# Patient Record
Sex: MALE | Race: OTHER | HISPANIC OR LATINO | URBAN - METROPOLITAN AREA
[De-identification: names, ages, dates, MRNs, and addresses within clinical notes are randomized per-mention and may not be internally consistent; named-entity substitution may affect disease eponyms.]

---

## 2022-06-21 ENCOUNTER — INPATIENT (INPATIENT)
Facility: HOSPITAL | Age: 57
LOS: 2 days | Discharge: HOME | End: 2022-06-24
Attending: HOSPITALIST | Admitting: HOSPITALIST
Payer: COMMERCIAL

## 2022-06-21 VITALS
SYSTOLIC BLOOD PRESSURE: 138 MMHG | OXYGEN SATURATION: 100 % | DIASTOLIC BLOOD PRESSURE: 71 MMHG | TEMPERATURE: 103 F | HEART RATE: 112 BPM | RESPIRATION RATE: 18 BRPM

## 2022-06-21 PROCEDURE — 93010 ELECTROCARDIOGRAM REPORT: CPT | Mod: NC

## 2022-06-21 PROCEDURE — 99285 EMERGENCY DEPT VISIT HI MDM: CPT | Mod: 25

## 2022-06-21 RX ORDER — SODIUM CHLORIDE 9 MG/ML
2000 INJECTION INTRAMUSCULAR; INTRAVENOUS; SUBCUTANEOUS ONCE
Refills: 0 | Status: COMPLETED | OUTPATIENT
Start: 2022-06-21 | End: 2022-06-21

## 2022-06-21 RX ORDER — KETOROLAC TROMETHAMINE 30 MG/ML
15 SYRINGE (ML) INJECTION ONCE
Refills: 0 | Status: DISCONTINUED | OUTPATIENT
Start: 2022-06-21 | End: 2022-06-21

## 2022-06-21 RX ORDER — ONDANSETRON 8 MG/1
4 TABLET, FILM COATED ORAL ONCE
Refills: 0 | Status: COMPLETED | OUTPATIENT
Start: 2022-06-21 | End: 2022-06-21

## 2022-06-21 RX ADMIN — SODIUM CHLORIDE 2000 MILLILITER(S): 9 INJECTION INTRAMUSCULAR; INTRAVENOUS; SUBCUTANEOUS at 23:35

## 2022-06-21 RX ADMIN — ONDANSETRON 4 MILLIGRAM(S): 8 TABLET, FILM COATED ORAL at 23:36

## 2022-06-21 RX ADMIN — Medication 15 MILLIGRAM(S): at 23:36

## 2022-06-21 NOTE — ED PROVIDER NOTE - CLINICAL SUMMARY MEDICAL DECISION MAKING FREE TEXT BOX
56M w no reported medical history presents w/ viral syndrome x 5 days, now w/ chest pain w/ cough, no clinical signs of dvt, ekg w/ rbbb, ambulatory sat reassuring, no urinary symptoms, no meningeal signs, plan for labs, rvp, cxr, trop/bnp, medications, fluids, reassessment see attending attestation

## 2022-06-21 NOTE — ED ADULT TRIAGE NOTE - CHIEF COMPLAINT QUOTE
Patient c/o fevers and SOB for 4 days. Endorses CP, body aches. Received two doses of COVID vaccine. Had negative rapid COVID and flu test today at urgent care. Last took Tylenol at 8pm.

## 2022-06-21 NOTE — ED PROVIDER NOTE - CADM POA PRESS ULCER
[Good understanding] : Patient has a good understanding of lifestyle changes and the steps needed to achieve self management goals
No

## 2022-06-21 NOTE — ED PROVIDER NOTE - OBJECTIVE STATEMENT
Swiss translation by daughter at bedside, declines translation services    56M w no reported medical history presents w 5 days of fevers, chills, nausea, body aches, cough, chest pain w/ cough, and exertional shortness of breath.

## 2022-06-21 NOTE — ED ADULT NURSE NOTE - OBJECTIVE STATEMENT
Malaysian speaking daughter translating at bedside. pt a&ox4 c/o subjective fevers, chills, nausea, body aches, cough, chest pain associated with cough, and headache associated with cough. pt c/o SOB upon exertion. pt respirations even and unlabored with no accessory muscle use. pt sat 98% on RA. 20g to the RAC. labs collected and sent. urine collected and sent. pt medicated as per md orders. pt ambulatory at baseline. pt in NAD and resting in stretcher. pt denies pmhx.

## 2022-06-21 NOTE — ED PROVIDER NOTE - NS ED ROS FT
Constitutional:  (+) fever, (+) chills, (-) lethargy  ENMT: (-) nasal discharge, (+) sore throat, (-) neck pain or stiffness  Cardiac: (+) chest pain (-) palpitations  Respiratory:  (+) cough (+) SOB   GI:  (+) nausea (-) vomiting (-) diarrhea (-) abdominal pain  :  (-) dysuria (-) frequency (-) burning  MS:  (-) back pain (-) joint pain.  Neuro:  (-) headache (-) numbness (-) tingling (-) focal weakness  Skin:  (-) rash

## 2022-06-21 NOTE — ED PROVIDER NOTE - ATTENDING APP SHARED VISIT CONTRIBUTION OF CARE
56M w no reported medical history presents w/ viral syndrome x 5 days, now w/ chest pain w/ cough, no clinical signs of dvt, ekg w/ rbbb, ambulatory sat reassuring, no urinary symptoms, no meningeal signs, plan for labs, rvp, cxr, trop/bnp, medications, fluids, reassessment

## 2022-06-21 NOTE — ED PROVIDER NOTE - PHYSICAL EXAMINATION
CONSTITUTIONAL: NAD, awake, alert  HEAD: Normocephalic; atraumatic  ENMT: External appears normal, MMM  NECK: no tenderness, FROM, no nuchal rigidity   CARD: Normal Sl, S2; no audible murmurs  RESP: normal wob, lungs ctab, ambulatory sat 95%  ABD: soft, non-distended; non-tender  MSK: no edema, normal ROM in all four extremities  SKIN: Warm, dry, no rashes  NEURO: aaox3, moving all extremities spontaneously

## 2022-06-21 NOTE — ED PROVIDER NOTE - NS ED ATTENDING STATEMENT MOD
Attending Only This was a shared visit with the EMMA. I reviewed and verified the documentation and independently performed the documented:

## 2022-06-21 NOTE — ED PROVIDER NOTE - PROGRESS NOTE DETAILS
ANTHONY Dc: Pt signed out to me pending labs, CXR and rpt O2. Pt now tachy and febrile, getting IV fluids, ordered for ofirmev. ANTHONY Dc: Spoke with hospitalist for admission as pt is tachycardic, reports does not feel enough improved to go home at this time, hospitalist recommending further ED evaluation for source of fever. Discussed with , pt ordered for CT chest. ANTHONY Nowak: Received sign out from ANTHONY Dc to f/u ct chest. Spoke w/ radiology, state CT chest consistent w/ RUL Tree-in-bud opacities. Pt consistently febrile, on 2L O2 NC due to hypoxia (88% when asleep, lows 90s when awake). Plan to continue to with admission. Pt ok w/ plan. ANTHONY Nowak: Received sign out from ANTHONY Dc to f/u ct chest. Spoke w/ radiology, state CT chest consistent w/ RUL Tree-in-bud opacities. Pt consistently febrile, on 2L O2 NC due to hypoxia (88% when asleep, lows 90s when awake). Plan to continue to with admission. Pt ok w/ plan. Used Language [employer]  ID [id number]

## 2022-06-22 DIAGNOSIS — A41.9 SEPSIS, UNSPECIFIED ORGANISM: ICD-10-CM

## 2022-06-22 DIAGNOSIS — J18.9 PNEUMONIA, UNSPECIFIED ORGANISM: ICD-10-CM

## 2022-06-22 DIAGNOSIS — Z87.81 PERSONAL HISTORY OF (HEALED) TRAUMATIC FRACTURE: Chronic | ICD-10-CM

## 2022-06-22 DIAGNOSIS — Z29.9 ENCOUNTER FOR PROPHYLACTIC MEASURES, UNSPECIFIED: ICD-10-CM

## 2022-06-22 LAB
ALBUMIN SERPL ELPH-MCNC: 4.7 G/DL — SIGNIFICANT CHANGE UP (ref 3.3–5)
ALP SERPL-CCNC: 102 U/L — SIGNIFICANT CHANGE UP (ref 40–120)
ALT FLD-CCNC: 21 U/L — SIGNIFICANT CHANGE UP (ref 4–41)
ANION GAP SERPL CALC-SCNC: 14 MMOL/L — SIGNIFICANT CHANGE UP (ref 7–14)
ANION GAP SERPL CALC-SCNC: 15 MMOL/L — HIGH (ref 7–14)
APPEARANCE UR: CLEAR — SIGNIFICANT CHANGE UP
AST SERPL-CCNC: 16 U/L — SIGNIFICANT CHANGE UP (ref 4–40)
B PERT DNA SPEC QL NAA+PROBE: SIGNIFICANT CHANGE UP
B PERT DNA SPEC QL NAA+PROBE: SIGNIFICANT CHANGE UP
B PERT+PARAPERT DNA PNL SPEC NAA+PROBE: SIGNIFICANT CHANGE UP
B PERT+PARAPERT DNA PNL SPEC NAA+PROBE: SIGNIFICANT CHANGE UP
BASE EXCESS BLDV CALC-SCNC: 1.2 MMOL/L — SIGNIFICANT CHANGE UP (ref -2–3)
BASOPHILS # BLD AUTO: 0.05 K/UL — SIGNIFICANT CHANGE UP (ref 0–0.2)
BASOPHILS NFR BLD AUTO: 0.3 % — SIGNIFICANT CHANGE UP (ref 0–2)
BILIRUB SERPL-MCNC: 0.5 MG/DL — SIGNIFICANT CHANGE UP (ref 0.2–1.2)
BILIRUB UR-MCNC: NEGATIVE — SIGNIFICANT CHANGE UP
BLOOD GAS VENOUS COMPREHENSIVE RESULT: SIGNIFICANT CHANGE UP
BORDETELLA PARAPERTUSSIS (RAPRVP): SIGNIFICANT CHANGE UP
BORDETELLA PARAPERTUSSIS (RAPRVP): SIGNIFICANT CHANGE UP
BUN SERPL-MCNC: 11 MG/DL — SIGNIFICANT CHANGE UP (ref 7–23)
BUN SERPL-MCNC: 16 MG/DL — SIGNIFICANT CHANGE UP (ref 7–23)
C PNEUM DNA SPEC QL NAA+PROBE: SIGNIFICANT CHANGE UP
C PNEUM DNA SPEC QL NAA+PROBE: SIGNIFICANT CHANGE UP
CALCIUM SERPL-MCNC: 8.7 MG/DL — SIGNIFICANT CHANGE UP (ref 8.4–10.5)
CALCIUM SERPL-MCNC: 9.2 MG/DL — SIGNIFICANT CHANGE UP (ref 8.4–10.5)
CHLORIDE BLDV-SCNC: 99 MMOL/L — SIGNIFICANT CHANGE UP (ref 96–108)
CHLORIDE SERPL-SCNC: 101 MMOL/L — SIGNIFICANT CHANGE UP (ref 98–107)
CHLORIDE SERPL-SCNC: 99 MMOL/L — SIGNIFICANT CHANGE UP (ref 98–107)
CO2 BLDV-SCNC: 30.2 MMOL/L — HIGH (ref 22–26)
CO2 SERPL-SCNC: 19 MMOL/L — LOW (ref 22–31)
CO2 SERPL-SCNC: 25 MMOL/L — SIGNIFICANT CHANGE UP (ref 22–31)
COLOR SPEC: YELLOW — SIGNIFICANT CHANGE UP
CREAT SERPL-MCNC: 0.64 MG/DL — SIGNIFICANT CHANGE UP (ref 0.5–1.3)
CREAT SERPL-MCNC: 0.78 MG/DL — SIGNIFICANT CHANGE UP (ref 0.5–1.3)
CULTURE RESULTS: SIGNIFICANT CHANGE UP
DIFF PNL FLD: NEGATIVE — SIGNIFICANT CHANGE UP
EGFR: 106 ML/MIN/1.73M2 — SIGNIFICANT CHANGE UP
EGFR: 112 ML/MIN/1.73M2 — SIGNIFICANT CHANGE UP
EOSINOPHIL # BLD AUTO: 0 K/UL — SIGNIFICANT CHANGE UP (ref 0–0.5)
EOSINOPHIL NFR BLD AUTO: 0 % — SIGNIFICANT CHANGE UP (ref 0–6)
FLUAV SUBTYP SPEC NAA+PROBE: SIGNIFICANT CHANGE UP
FLUAV SUBTYP SPEC NAA+PROBE: SIGNIFICANT CHANGE UP
FLUBV RNA SPEC QL NAA+PROBE: SIGNIFICANT CHANGE UP
FLUBV RNA SPEC QL NAA+PROBE: SIGNIFICANT CHANGE UP
GAS PNL BLDV: 134 MMOL/L — LOW (ref 136–145)
GLUCOSE BLDV-MCNC: 119 MG/DL — HIGH (ref 70–99)
GLUCOSE SERPL-MCNC: 107 MG/DL — HIGH (ref 70–99)
GLUCOSE SERPL-MCNC: 120 MG/DL — HIGH (ref 70–99)
GLUCOSE UR QL: NEGATIVE — SIGNIFICANT CHANGE UP
GRAM STN FLD: SIGNIFICANT CHANGE UP
HADV DNA SPEC QL NAA+PROBE: SIGNIFICANT CHANGE UP
HADV DNA SPEC QL NAA+PROBE: SIGNIFICANT CHANGE UP
HCO3 BLDV-SCNC: 28 MMOL/L — SIGNIFICANT CHANGE UP (ref 22–29)
HCOV 229E RNA SPEC QL NAA+PROBE: SIGNIFICANT CHANGE UP
HCOV 229E RNA SPEC QL NAA+PROBE: SIGNIFICANT CHANGE UP
HCOV HKU1 RNA SPEC QL NAA+PROBE: SIGNIFICANT CHANGE UP
HCOV HKU1 RNA SPEC QL NAA+PROBE: SIGNIFICANT CHANGE UP
HCOV NL63 RNA SPEC QL NAA+PROBE: SIGNIFICANT CHANGE UP
HCOV NL63 RNA SPEC QL NAA+PROBE: SIGNIFICANT CHANGE UP
HCOV OC43 RNA SPEC QL NAA+PROBE: SIGNIFICANT CHANGE UP
HCOV OC43 RNA SPEC QL NAA+PROBE: SIGNIFICANT CHANGE UP
HCT VFR BLD CALC: 44.3 % — SIGNIFICANT CHANGE UP (ref 39–50)
HCT VFR BLD CALC: 46.7 % — SIGNIFICANT CHANGE UP (ref 39–50)
HCT VFR BLDA CALC: 47 % — SIGNIFICANT CHANGE UP (ref 39–51)
HGB BLD CALC-MCNC: 15.7 G/DL — SIGNIFICANT CHANGE UP (ref 13–17)
HGB BLD-MCNC: 14.2 G/DL — SIGNIFICANT CHANGE UP (ref 13–17)
HGB BLD-MCNC: 15 G/DL — SIGNIFICANT CHANGE UP (ref 13–17)
HMPV RNA SPEC QL NAA+PROBE: SIGNIFICANT CHANGE UP
HMPV RNA SPEC QL NAA+PROBE: SIGNIFICANT CHANGE UP
HPIV1 RNA SPEC QL NAA+PROBE: SIGNIFICANT CHANGE UP
HPIV1 RNA SPEC QL NAA+PROBE: SIGNIFICANT CHANGE UP
HPIV2 RNA SPEC QL NAA+PROBE: SIGNIFICANT CHANGE UP
HPIV2 RNA SPEC QL NAA+PROBE: SIGNIFICANT CHANGE UP
HPIV3 RNA SPEC QL NAA+PROBE: SIGNIFICANT CHANGE UP
HPIV3 RNA SPEC QL NAA+PROBE: SIGNIFICANT CHANGE UP
HPIV4 RNA SPEC QL NAA+PROBE: SIGNIFICANT CHANGE UP
HPIV4 RNA SPEC QL NAA+PROBE: SIGNIFICANT CHANGE UP
IANC: 12.97 K/UL — HIGH (ref 1.8–7.4)
IMM GRANULOCYTES NFR BLD AUTO: 0.8 % — SIGNIFICANT CHANGE UP (ref 0–1.5)
KETONES UR-MCNC: NEGATIVE — SIGNIFICANT CHANGE UP
LACTATE BLDV-MCNC: 2.5 MMOL/L — HIGH (ref 0.5–2)
LACTATE SERPL-SCNC: 1.5 MMOL/L — SIGNIFICANT CHANGE UP (ref 0.5–2)
LEUKOCYTE ESTERASE UR-ACNC: NEGATIVE — SIGNIFICANT CHANGE UP
LYMPHOCYTES # BLD AUTO: 1.37 K/UL — SIGNIFICANT CHANGE UP (ref 1–3.3)
LYMPHOCYTES # BLD AUTO: 8.8 % — LOW (ref 13–44)
M PNEUMO DNA SPEC QL NAA+PROBE: SIGNIFICANT CHANGE UP
M PNEUMO DNA SPEC QL NAA+PROBE: SIGNIFICANT CHANGE UP
MAGNESIUM SERPL-MCNC: 2.1 MG/DL — SIGNIFICANT CHANGE UP (ref 1.6–2.6)
MCHC RBC-ENTMCNC: 28.4 PG — SIGNIFICANT CHANGE UP (ref 27–34)
MCHC RBC-ENTMCNC: 28.5 PG — SIGNIFICANT CHANGE UP (ref 27–34)
MCHC RBC-ENTMCNC: 32.1 GM/DL — SIGNIFICANT CHANGE UP (ref 32–36)
MCHC RBC-ENTMCNC: 32.1 GM/DL — SIGNIFICANT CHANGE UP (ref 32–36)
MCV RBC AUTO: 88.3 FL — SIGNIFICANT CHANGE UP (ref 80–100)
MCV RBC AUTO: 89 FL — SIGNIFICANT CHANGE UP (ref 80–100)
MONOCYTES # BLD AUTO: 1.03 K/UL — HIGH (ref 0–0.9)
MONOCYTES NFR BLD AUTO: 6.6 % — SIGNIFICANT CHANGE UP (ref 2–14)
NEUTROPHILS # BLD AUTO: 12.97 K/UL — HIGH (ref 1.8–7.4)
NEUTROPHILS NFR BLD AUTO: 83.5 % — HIGH (ref 43–77)
NITRITE UR-MCNC: NEGATIVE — SIGNIFICANT CHANGE UP
NRBC # BLD: 0 /100 WBCS — SIGNIFICANT CHANGE UP
NRBC # BLD: 0 /100 WBCS — SIGNIFICANT CHANGE UP
NRBC # FLD: 0 K/UL — SIGNIFICANT CHANGE UP
NRBC # FLD: 0 K/UL — SIGNIFICANT CHANGE UP
NT-PROBNP SERPL-SCNC: 96 PG/ML — SIGNIFICANT CHANGE UP
PCO2 BLDV: 54 MMHG — SIGNIFICANT CHANGE UP (ref 42–55)
PH BLDV: 7.33 — SIGNIFICANT CHANGE UP (ref 7.32–7.43)
PH UR: 6 — SIGNIFICANT CHANGE UP (ref 5–8)
PHOSPHATE SERPL-MCNC: 1.7 MG/DL — LOW (ref 2.5–4.5)
PLATELET # BLD AUTO: 182 K/UL — SIGNIFICANT CHANGE UP (ref 150–400)
PLATELET # BLD AUTO: 227 K/UL — SIGNIFICANT CHANGE UP (ref 150–400)
PO2 BLDV: 27 MMHG — SIGNIFICANT CHANGE UP
POTASSIUM BLDV-SCNC: 3.8 MMOL/L — SIGNIFICANT CHANGE UP (ref 3.5–5.1)
POTASSIUM SERPL-MCNC: 3.8 MMOL/L — SIGNIFICANT CHANGE UP (ref 3.5–5.3)
POTASSIUM SERPL-MCNC: 4.1 MMOL/L — SIGNIFICANT CHANGE UP (ref 3.5–5.3)
POTASSIUM SERPL-SCNC: 3.8 MMOL/L — SIGNIFICANT CHANGE UP (ref 3.5–5.3)
POTASSIUM SERPL-SCNC: 4.1 MMOL/L — SIGNIFICANT CHANGE UP (ref 3.5–5.3)
PROCALCITONIN SERPL-MCNC: 1.66 NG/ML — HIGH (ref 0.02–0.1)
PROT SERPL-MCNC: 8.1 G/DL — SIGNIFICANT CHANGE UP (ref 6–8.3)
PROT UR-MCNC: NEGATIVE — SIGNIFICANT CHANGE UP
RAPID RVP RESULT: SIGNIFICANT CHANGE UP
RAPID RVP RESULT: SIGNIFICANT CHANGE UP
RBC # BLD: 4.98 M/UL — SIGNIFICANT CHANGE UP (ref 4.2–5.8)
RBC # BLD: 5.29 M/UL — SIGNIFICANT CHANGE UP (ref 4.2–5.8)
RBC # FLD: 14.6 % — HIGH (ref 10.3–14.5)
RBC # FLD: 14.6 % — HIGH (ref 10.3–14.5)
RSV RNA SPEC QL NAA+PROBE: SIGNIFICANT CHANGE UP
RSV RNA SPEC QL NAA+PROBE: SIGNIFICANT CHANGE UP
RV+EV RNA SPEC QL NAA+PROBE: SIGNIFICANT CHANGE UP
RV+EV RNA SPEC QL NAA+PROBE: SIGNIFICANT CHANGE UP
SAO2 % BLDV: 38.6 % — SIGNIFICANT CHANGE UP
SARS-COV-2 RNA SPEC QL NAA+PROBE: SIGNIFICANT CHANGE UP
SARS-COV-2 RNA SPEC QL NAA+PROBE: SIGNIFICANT CHANGE UP
SODIUM SERPL-SCNC: 134 MMOL/L — LOW (ref 135–145)
SODIUM SERPL-SCNC: 139 MMOL/L — SIGNIFICANT CHANGE UP (ref 135–145)
SP GR SPEC: 1.02 — SIGNIFICANT CHANGE UP (ref 1–1.05)
SPECIMEN SOURCE: SIGNIFICANT CHANGE UP
SPECIMEN SOURCE: SIGNIFICANT CHANGE UP
TROPONIN T, HIGH SENSITIVITY RESULT: 7 NG/L — SIGNIFICANT CHANGE UP
UROBILINOGEN FLD QL: SIGNIFICANT CHANGE UP
WBC # BLD: 15.54 K/UL — HIGH (ref 3.8–10.5)
WBC # BLD: 19.5 K/UL — HIGH (ref 3.8–10.5)
WBC # FLD AUTO: 15.54 K/UL — HIGH (ref 3.8–10.5)
WBC # FLD AUTO: 19.5 K/UL — HIGH (ref 3.8–10.5)

## 2022-06-22 PROCEDURE — 71250 CT THORAX DX C-: CPT | Mod: 26

## 2022-06-22 PROCEDURE — 71045 X-RAY EXAM CHEST 1 VIEW: CPT | Mod: 26

## 2022-06-22 PROCEDURE — 99223 1ST HOSP IP/OBS HIGH 75: CPT

## 2022-06-22 RX ORDER — ACETAMINOPHEN 500 MG
1000 TABLET ORAL ONCE
Refills: 0 | Status: COMPLETED | OUTPATIENT
Start: 2022-06-22 | End: 2022-06-22

## 2022-06-22 RX ORDER — INFLUENZA VIRUS VACCINE 15; 15; 15; 15 UG/.5ML; UG/.5ML; UG/.5ML; UG/.5ML
0.5 SUSPENSION INTRAMUSCULAR ONCE
Refills: 0 | Status: DISCONTINUED | OUTPATIENT
Start: 2022-06-22 | End: 2022-06-24

## 2022-06-22 RX ORDER — GUAIFENESIN/DEXTROMETHORPHAN 600MG-30MG
10 TABLET, EXTENDED RELEASE 12 HR ORAL EVERY 6 HOURS
Refills: 0 | Status: DISCONTINUED | OUTPATIENT
Start: 2022-06-22 | End: 2022-06-22

## 2022-06-22 RX ORDER — ENOXAPARIN SODIUM 100 MG/ML
40 INJECTION SUBCUTANEOUS EVERY 24 HOURS
Refills: 0 | Status: DISCONTINUED | OUTPATIENT
Start: 2022-06-23 | End: 2022-06-24

## 2022-06-22 RX ORDER — PIPERACILLIN AND TAZOBACTAM 4; .5 G/20ML; G/20ML
3.38 INJECTION, POWDER, LYOPHILIZED, FOR SOLUTION INTRAVENOUS EVERY 8 HOURS
Refills: 0 | Status: DISCONTINUED | OUTPATIENT
Start: 2022-06-22 | End: 2022-06-23

## 2022-06-22 RX ORDER — PIPERACILLIN AND TAZOBACTAM 4; .5 G/20ML; G/20ML
3.38 INJECTION, POWDER, LYOPHILIZED, FOR SOLUTION INTRAVENOUS ONCE
Refills: 0 | Status: COMPLETED | OUTPATIENT
Start: 2022-06-22 | End: 2022-06-22

## 2022-06-22 RX ORDER — CEFTRIAXONE 500 MG/1
1000 INJECTION, POWDER, FOR SOLUTION INTRAMUSCULAR; INTRAVENOUS ONCE
Refills: 0 | Status: COMPLETED | OUTPATIENT
Start: 2022-06-22 | End: 2022-06-22

## 2022-06-22 RX ORDER — VANCOMYCIN HCL 1 G
1000 VIAL (EA) INTRAVENOUS EVERY 12 HOURS
Refills: 0 | Status: DISCONTINUED | OUTPATIENT
Start: 2022-06-22 | End: 2022-06-23

## 2022-06-22 RX ORDER — AZITHROMYCIN 500 MG/1
500 TABLET, FILM COATED ORAL EVERY 24 HOURS
Refills: 0 | Status: DISCONTINUED | OUTPATIENT
Start: 2022-06-23 | End: 2022-06-24

## 2022-06-22 RX ORDER — SODIUM CHLORIDE 9 MG/ML
1000 INJECTION INTRAMUSCULAR; INTRAVENOUS; SUBCUTANEOUS
Refills: 0 | Status: DISCONTINUED | OUTPATIENT
Start: 2022-06-22 | End: 2022-06-24

## 2022-06-22 RX ORDER — POTASSIUM PHOSPHATE, MONOBASIC POTASSIUM PHOSPHATE, DIBASIC 236; 224 MG/ML; MG/ML
15 INJECTION, SOLUTION INTRAVENOUS ONCE
Refills: 0 | Status: COMPLETED | OUTPATIENT
Start: 2022-06-22 | End: 2022-06-22

## 2022-06-22 RX ORDER — GUAIFENESIN/DEXTROMETHORPHAN 600MG-30MG
10 TABLET, EXTENDED RELEASE 12 HR ORAL EVERY 6 HOURS
Refills: 0 | Status: COMPLETED | OUTPATIENT
Start: 2022-06-22 | End: 2022-06-24

## 2022-06-22 RX ORDER — AZITHROMYCIN 500 MG/1
500 TABLET, FILM COATED ORAL ONCE
Refills: 0 | Status: COMPLETED | OUTPATIENT
Start: 2022-06-22 | End: 2022-06-22

## 2022-06-22 RX ORDER — ACETAMINOPHEN 500 MG
650 TABLET ORAL EVERY 6 HOURS
Refills: 0 | Status: DISCONTINUED | OUTPATIENT
Start: 2022-06-22 | End: 2022-06-24

## 2022-06-22 RX ADMIN — POTASSIUM PHOSPHATE, MONOBASIC POTASSIUM PHOSPHATE, DIBASIC 62.5 MILLIMOLE(S): 236; 224 INJECTION, SOLUTION INTRAVENOUS at 18:27

## 2022-06-22 RX ADMIN — Medication 650 MILLIGRAM(S): at 19:53

## 2022-06-22 RX ADMIN — AZITHROMYCIN 255 MILLIGRAM(S): 500 TABLET, FILM COATED ORAL at 10:55

## 2022-06-22 RX ADMIN — PIPERACILLIN AND TAZOBACTAM 200 GRAM(S): 4; .5 INJECTION, POWDER, LYOPHILIZED, FOR SOLUTION INTRAVENOUS at 15:04

## 2022-06-22 RX ADMIN — Medication 100 MILLIGRAM(S): at 23:28

## 2022-06-22 RX ADMIN — Medication 250 MILLIGRAM(S): at 15:52

## 2022-06-22 RX ADMIN — SODIUM CHLORIDE 75 MILLILITER(S): 9 INJECTION INTRAMUSCULAR; INTRAVENOUS; SUBCUTANEOUS at 16:37

## 2022-06-22 RX ADMIN — CEFTRIAXONE 100 MILLIGRAM(S): 500 INJECTION, POWDER, FOR SOLUTION INTRAMUSCULAR; INTRAVENOUS at 03:58

## 2022-06-22 RX ADMIN — ENOXAPARIN SODIUM 40 MILLIGRAM(S): 100 INJECTION SUBCUTANEOUS at 23:29

## 2022-06-22 RX ADMIN — Medication 10 MILLILITER(S): at 18:26

## 2022-06-22 RX ADMIN — Medication 650 MILLIGRAM(S): at 20:23

## 2022-06-22 RX ADMIN — Medication 400 MILLIGRAM(S): at 08:26

## 2022-06-22 RX ADMIN — Medication 400 MILLIGRAM(S): at 02:49

## 2022-06-22 RX ADMIN — PIPERACILLIN AND TAZOBACTAM 25 GRAM(S): 4; .5 INJECTION, POWDER, LYOPHILIZED, FOR SOLUTION INTRAVENOUS at 23:29

## 2022-06-22 NOTE — ED ADULT NURSE REASSESSMENT NOTE - NS ED NURSE REASSESS COMMENT FT1
report received from overnight RN Dyan. pt is A&Ox4, sleeping and resting comfortably. NAD. pt is stating 88% on RA on pulse ox. placed on 2L of NC. ANTHONY Nowak made aware. respirations are even and un labored. call bell at bedside. medication given as ordered. safety precautions maintained.
pt is resting comfortably. NAD. pt denies SOB, chest pain, dizziness, weakness, urinary symptoms, HA, n/v/d, fevers, chills. respirations are even and un labored. safety precautions maintained.

## 2022-06-22 NOTE — H&P ADULT - NEGATIVE NEUROLOGICAL SYMPTOMS
no weakness/no paresthesias/no generalized seizures/no vertigo/no loss of sensation/no difficulty walking/no loss of consciousness

## 2022-06-22 NOTE — H&P ADULT - NEGATIVE MUSCULOSKELETAL SYMPTOMS
no arthralgia/no joint swelling/no myalgia/no muscle weakness/no back pain/no leg pain L/no leg pain R

## 2022-06-22 NOTE — H&P ADULT - NS ATTEND AMEND GEN_ALL_CORE FT
56M with no reported PMHx reports cough, myalgia, fever x  2 weeks, found to have PNA on CT. Admitted for Sepsis due to PNA     ID [id number]    Pt endorsed + cough, initially yellow and became blood tinged after days of coughing. + sick contacts with grandkid, they got better but his symptoms are lingering. +nausea. Increased phelgm. +crackles on exam    Sepsis ( leukocytosis, tachycardia, tachypnea, fever) due to PNA: Present on admission. Given prior URI/Viral picture before worsening Sx, suspecting post-viral bacterial PNA, will broaden coverage of Abx Vanc., zosyn, azithromycin ( suspecting Legionella given GI Sx). Check Urine legionella, sputum Cx, Quant gold ( lower suspicion for TB given no recent travel). CT chest shows right upper lobe tree-in-bud and groundglass opacities may be compatible with endobronchial spread of infection.     Per pt, no need to discuss with daughter, he will explain plan for care to her.

## 2022-06-22 NOTE — H&P ADULT - PROBLEM SELECTOR PLAN 1
- Patient with likely viral illness at first a few weeks back; +sick contacts at home  - Now symptoms consistent with possible secondary bacterial PNA with sepsis present   - WBC 15.5, tachypnea in mid 20s, sinus tachycardia, lactate 2.5->1.5 and blood tinged yellow sputum - no [place] hemoptysis   - RVP negative x 2, BCx and UCx pending in lab  - CT chest shows right upper lobe tree-in-bud and groundglass opacities may be compatible   with endobronchial spread of infection  - will check sputum culture, check MRSA PCR, and urine legionella/strep pneumo   - will check quantiferon gold in AM - though low suspicion for active TB at this time   - discussed CT findings with radiology - imaging not typical of TB infection; will hold off on respiratory isolation  - s/p CTX and Azithro in ED - given severity of illness, will broaden to Vanc 1gm q12 and Zosyn q8h and continue Azithro 500 IV q24h pending legionella results  - saturating 96-97% on room air at time of my interview, continue supplemental O2 with NC as needed   - if any decompensation or failure to improve on above regimen will consider ID and pulm consultation  - continue robitussin and tessalon ATC x 2 days for now Sepsis ( leukocytosis, tachycardia, tachypnea, fever) due to PNA: Present on admission   - Patient with likely viral illness at first a few weeks back; +sick contacts at home  - Now symptoms consistent with possible secondary bacterial PNA with sepsis present   - WBC 15.5, tachypnea in mid 20s, sinus tachycardia, lactate 2.5->1.5 and blood tinged yellow sputum - no [place] hemoptysis   - RVP negative x 2, BCx and UCx pending in lab  - CT chest shows right upper lobe tree-in-bud and groundglass opacities may be compatible   with endobronchial spread of infection  - will check sputum culture, check MRSA PCR, and urine legionella/strep pneumo   - will check quantiferon gold in AM - though low suspicion for active TB at this time   - discussed CT findings with radiology - imaging not typical of TB infection; will hold off on respiratory isolation  - s/p CTX and Azithro in ED - given severity of illness, will broaden to Vanc 1gm q12 and Zosyn q8h and continue Azithro 500 IV q24h pending legionella results  - saturating 96-97% on room air at time of my interview, continue supplemental O2 with NC as needed   - if any decompensation or failure to improve on above regimen will consider ID and pulm consultation  - continue robitussin and tessalon ATC x 2 days for now

## 2022-06-22 NOTE — H&P ADULT - NSHPSOCIALHISTORY_GEN_ALL_CORE
Lives with wife, daughter, and 2 young grand children.    Denies smoking.   Quit drinking 5 years ago.  Denies drugs.

## 2022-06-22 NOTE — H&P ADULT - HISTORY OF PRESENT ILLNESS
56M with no reported PMHx reports feeling sick for about 2 weeks. Around 2 weeks ago he had a cough and body aches. Around Thursday of last week he started spiking fevers (felt hot to touch) with chills. His cough became productive of yellow blood tinged sputum. He endorses having intermittent headache, pleuritic chest pain with deep breath and coughing, nausea but no vomiting, decreased appetite, and sore throat. Of note, he lives with his wife and their daughter and 2 young children. About 1 month ago, their children got sick, which later spread to his wife and daughter, and then he caught it. They all recovered normally but he continued to get worse as discussed above. He otherwise denied having any CP at time of interview, no abdominal pain, changes in hearing or vision, focal weakness, trouble ambulating, recent travel, dysuria, or vomitting/diarrhea/constipation. In the ED he was found to be febrile, tachycardiac, tachypneic, and CT chest was performed showing right upper lobe tree-in-bud and ground glass opacities may be compatible with endobronchial spread of infection and he was started on CTX and Azithro. He was noted to desaturate to 88% O2 while sleeping, ambulatory O2 % was 95%. At time of my examination he was 96% on room air; in ED, he was placed on 3L via NC O2.

## 2022-06-22 NOTE — H&P ADULT - NEGATIVE GASTROINTESTINAL SYMPTOMS
no vomiting/no diarrhea/no constipation/no change in bowel habits/no abdominal pain/no melena/no hematochezia

## 2022-06-23 DIAGNOSIS — E87.8 OTHER DISORDERS OF ELECTROLYTE AND FLUID BALANCE, NOT ELSEWHERE CLASSIFIED: ICD-10-CM

## 2022-06-23 LAB
ALBUMIN SERPL ELPH-MCNC: 3.6 G/DL — SIGNIFICANT CHANGE UP (ref 3.3–5)
ALP SERPL-CCNC: 84 U/L — SIGNIFICANT CHANGE UP (ref 40–120)
ALT FLD-CCNC: 20 U/L — SIGNIFICANT CHANGE UP (ref 4–41)
ANION GAP SERPL CALC-SCNC: 11 MMOL/L — SIGNIFICANT CHANGE UP (ref 7–14)
AST SERPL-CCNC: 18 U/L — SIGNIFICANT CHANGE UP (ref 4–40)
BASOPHILS # BLD AUTO: 0.17 K/UL — SIGNIFICANT CHANGE UP (ref 0–0.2)
BASOPHILS NFR BLD AUTO: 0.9 % — SIGNIFICANT CHANGE UP (ref 0–2)
BILIRUB SERPL-MCNC: 0.3 MG/DL — SIGNIFICANT CHANGE UP (ref 0.2–1.2)
BUN SERPL-MCNC: 10 MG/DL — SIGNIFICANT CHANGE UP (ref 7–23)
CALCIUM SERPL-MCNC: 8.4 MG/DL — SIGNIFICANT CHANGE UP (ref 8.4–10.5)
CHLORIDE SERPL-SCNC: 99 MMOL/L — SIGNIFICANT CHANGE UP (ref 98–107)
CO2 SERPL-SCNC: 24 MMOL/L — SIGNIFICANT CHANGE UP (ref 22–31)
CREAT SERPL-MCNC: 0.6 MG/DL — SIGNIFICANT CHANGE UP (ref 0.5–1.3)
EGFR: 115 ML/MIN/1.73M2 — SIGNIFICANT CHANGE UP
EOSINOPHIL # BLD AUTO: 0 K/UL — SIGNIFICANT CHANGE UP (ref 0–0.5)
EOSINOPHIL NFR BLD AUTO: 0 % — SIGNIFICANT CHANGE UP (ref 0–6)
GLUCOSE SERPL-MCNC: 151 MG/DL — HIGH (ref 70–99)
HCT VFR BLD CALC: 42.2 % — SIGNIFICANT CHANGE UP (ref 39–50)
HGB BLD-MCNC: 13.5 G/DL — SIGNIFICANT CHANGE UP (ref 13–17)
IANC: 15.12 K/UL — HIGH (ref 1.8–7.4)
LYMPHOCYTES # BLD AUTO: 1.46 K/UL — SIGNIFICANT CHANGE UP (ref 1–3.3)
LYMPHOCYTES # BLD AUTO: 7.8 % — LOW (ref 13–44)
MANUAL SMEAR VERIFICATION: SIGNIFICANT CHANGE UP
MCHC RBC-ENTMCNC: 29 PG — SIGNIFICANT CHANGE UP (ref 27–34)
MCHC RBC-ENTMCNC: 32 GM/DL — SIGNIFICANT CHANGE UP (ref 32–36)
MCV RBC AUTO: 90.6 FL — SIGNIFICANT CHANGE UP (ref 80–100)
MONOCYTES # BLD AUTO: 1.14 K/UL — HIGH (ref 0–0.9)
MONOCYTES NFR BLD AUTO: 6.1 % — SIGNIFICANT CHANGE UP (ref 2–14)
NEUTROPHILS # BLD AUTO: 15.98 K/UL — HIGH (ref 1.8–7.4)
NEUTROPHILS NFR BLD AUTO: 75.6 % — SIGNIFICANT CHANGE UP (ref 43–77)
NEUTS BAND # BLD: 9.6 % — HIGH (ref 0–6)
PLAT MORPH BLD: NORMAL — SIGNIFICANT CHANGE UP
PLATELET # BLD AUTO: 168 K/UL — SIGNIFICANT CHANGE UP (ref 150–400)
PLATELET COUNT - ESTIMATE: NORMAL — SIGNIFICANT CHANGE UP
POTASSIUM SERPL-MCNC: 3.6 MMOL/L — SIGNIFICANT CHANGE UP (ref 3.5–5.3)
POTASSIUM SERPL-SCNC: 3.6 MMOL/L — SIGNIFICANT CHANGE UP (ref 3.5–5.3)
PROT SERPL-MCNC: 6.8 G/DL — SIGNIFICANT CHANGE UP (ref 6–8.3)
RBC # BLD: 4.66 M/UL — SIGNIFICANT CHANGE UP (ref 4.2–5.8)
RBC # FLD: 14.7 % — HIGH (ref 10.3–14.5)
RBC BLD AUTO: NORMAL — SIGNIFICANT CHANGE UP
SODIUM SERPL-SCNC: 134 MMOL/L — LOW (ref 135–145)
WBC # BLD: 18.76 K/UL — HIGH (ref 3.8–10.5)
WBC # FLD AUTO: 18.76 K/UL — HIGH (ref 3.8–10.5)

## 2022-06-23 PROCEDURE — 99222 1ST HOSP IP/OBS MODERATE 55: CPT

## 2022-06-23 PROCEDURE — 99233 SBSQ HOSP IP/OBS HIGH 50: CPT

## 2022-06-23 RX ORDER — CEFTRIAXONE 500 MG/1
INJECTION, POWDER, FOR SOLUTION INTRAMUSCULAR; INTRAVENOUS
Refills: 0 | Status: DISCONTINUED | OUTPATIENT
Start: 2022-06-23 | End: 2022-06-24

## 2022-06-23 RX ORDER — CEFTRIAXONE 500 MG/1
1000 INJECTION, POWDER, FOR SOLUTION INTRAMUSCULAR; INTRAVENOUS EVERY 24 HOURS
Refills: 0 | Status: DISCONTINUED | OUTPATIENT
Start: 2022-06-24 | End: 2022-06-24

## 2022-06-23 RX ORDER — CEFTRIAXONE 500 MG/1
1000 INJECTION, POWDER, FOR SOLUTION INTRAMUSCULAR; INTRAVENOUS ONCE
Refills: 0 | Status: COMPLETED | OUTPATIENT
Start: 2022-06-23 | End: 2022-06-23

## 2022-06-23 RX ADMIN — Medication 100 MILLIGRAM(S): at 14:59

## 2022-06-23 RX ADMIN — Medication 10 MILLILITER(S): at 11:17

## 2022-06-23 RX ADMIN — Medication 650 MILLIGRAM(S): at 22:45

## 2022-06-23 RX ADMIN — ENOXAPARIN SODIUM 40 MILLIGRAM(S): 100 INJECTION SUBCUTANEOUS at 21:59

## 2022-06-23 RX ADMIN — Medication 10 MILLILITER(S): at 22:00

## 2022-06-23 RX ADMIN — CEFTRIAXONE 100 MILLIGRAM(S): 500 INJECTION, POWDER, FOR SOLUTION INTRAMUSCULAR; INTRAVENOUS at 22:00

## 2022-06-23 RX ADMIN — Medication 250 MILLIGRAM(S): at 05:37

## 2022-06-23 RX ADMIN — Medication 650 MILLIGRAM(S): at 22:15

## 2022-06-23 RX ADMIN — Medication 100 MILLIGRAM(S): at 21:59

## 2022-06-23 RX ADMIN — Medication 10 MILLILITER(S): at 06:59

## 2022-06-23 RX ADMIN — PIPERACILLIN AND TAZOBACTAM 25 GRAM(S): 4; .5 INJECTION, POWDER, LYOPHILIZED, FOR SOLUTION INTRAVENOUS at 14:59

## 2022-06-23 RX ADMIN — Medication 10 MILLILITER(S): at 17:12

## 2022-06-23 RX ADMIN — Medication 100 MILLIGRAM(S): at 06:58

## 2022-06-23 RX ADMIN — PIPERACILLIN AND TAZOBACTAM 25 GRAM(S): 4; .5 INJECTION, POWDER, LYOPHILIZED, FOR SOLUTION INTRAVENOUS at 07:00

## 2022-06-23 RX ADMIN — AZITHROMYCIN 255 MILLIGRAM(S): 500 TABLET, FILM COATED ORAL at 11:13

## 2022-06-23 RX ADMIN — Medication 250 MILLIGRAM(S): at 17:12

## 2022-06-23 NOTE — PROGRESS NOTE ADULT - SUBJECTIVE AND OBJECTIVE BOX
Patient is a 56y old  Male who presents with a chief complaint of Patient admitted with shortness of breath, subjective fever, cough (2022 13:45)      SUBJECTIVE / OVERNIGHT EVENTS:    MEDICATIONS  (STANDING):  azithromycin  IVPB 500 milliGRAM(s) IV Intermittent every 24 hours  benzonatate 100 milliGRAM(s) Oral every 8 hours  enoxaparin Injectable 40 milliGRAM(s) SubCutaneous every 24 hours  guaifenesin/dextromethorphan Oral Liquid 10 milliLiter(s) Oral every 6 hours  influenza   Vaccine 0.5 milliLiter(s) IntraMuscular once  piperacillin/tazobactam IVPB.. 3.375 Gram(s) IV Intermittent every 8 hours  sodium chloride 0.9%. 1000 milliLiter(s) (75 mL/Hr) IV Continuous <Continuous>  vancomycin  IVPB 1000 milliGRAM(s) IV Intermittent every 12 hours    MEDICATIONS  (PRN):  acetaminophen     Tablet .. 650 milliGRAM(s) Oral every 6 hours PRN Temp greater or equal to 38C (100.4F), Severe Pain (7 - 10)      Vital Signs Last 24 Hrs  T(C): 37.4 (2022 06:55), Max: 39.4 (2022 19:53)  T(F): 99.3 (2022 06:55), Max: 102.9 (2022 19:53)  HR: 94 (2022 06:55) (83 - 118)  BP: 126/61 (2022 06:55) (122/74 - 132/60)  BP(mean): --  RR: 20 (2022 06:55) (20 - 22)  SpO2: 97% (2022 06:55) (96% - 100%)  CAPILLARY BLOOD GLUCOSE        I&O's Summary      PHYSICAL EXAM:  GENERAL: NAD, well-developed  HEAD:  Atraumatic, Normocephalic  EYES: EOMI, PERRLA, conjunctiva and sclera clear  NECK: Supple, No JVD  CHEST/LUNG: Clear to auscultation bilaterally; No wheeze  HEART: Regular rate and rhythm; No murmurs, rubs, or gallops  ABDOMEN: Soft, Nontender, Nondistended; Bowel sounds present  EXTREMITIES:  2+ Peripheral Pulses, No clubbing, cyanosis, or edema  PSYCH: AAOx3  NEUROLOGY: non-focal  SKIN: No rashes or lesions    LABS:                        13.5   18.76 )-----------( 168      ( 2022 06:28 )             42.2     06-    134<L>  |  99  |  10  ----------------------------<  151<H>  3.6   |  24  |  0.60    Ca    8.4      2022 06:28  Phos  1.7     -  Mg     2.10     -    TPro  6.8  /  Alb  3.6  /  TBili  0.3  /  DBili  x   /  AST  18  /  ALT  20  /  AlkPhos  84  -          Urinalysis Basic - ( 2022 23:40 )    Color: Yellow / Appearance: Clear / S.019 / pH: x  Gluc: x / Ketone: Negative  / Bili: Negative / Urobili: <2 mg/dL   Blood: x / Protein: Negative / Nitrite: Negative   Leuk Esterase: Negative / RBC: x / WBC x   Sq Epi: x / Non Sq Epi: x / Bacteria: x        RADIOLOGY & ADDITIONAL TESTS:    Imaging Personally Reviewed:    Consultant(s) Notes Reviewed:      Care Discussed with Consultants/Other Providers:   Patient is a 56y old  Male who presents with a chief complaint of Patient admitted with shortness of breath, subjective fever, cough (2022 13:45)      SUBJECTIVE / OVERNIGHT EVENTS: patient seen and examined by bedside, pt awake and alert, pt says he feels much better, reports cough with blood tinged sputum . mils SOB , pt also c/o diarrheax2 times since morning ,  denies fever, chills,   headache, dizziness, , CP, Palpitations , N/V/, abdominal pain  pt was febrile to 102.9 overnight   history obtained with  ISD#[id number]    MEDICATIONS  (STANDING):  azithromycin  IVPB 500 milliGRAM(s) IV Intermittent every 24 hours  benzonatate 100 milliGRAM(s) Oral every 8 hours  enoxaparin Injectable 40 milliGRAM(s) SubCutaneous every 24 hours  guaifenesin/dextromethorphan Oral Liquid 10 milliLiter(s) Oral every 6 hours  influenza   Vaccine 0.5 milliLiter(s) IntraMuscular once  piperacillin/tazobactam IVPB.. 3.375 Gram(s) IV Intermittent every 8 hours  sodium chloride 0.9%. 1000 milliLiter(s) (75 mL/Hr) IV Continuous <Continuous>  vancomycin  IVPB 1000 milliGRAM(s) IV Intermittent every 12 hours    MEDICATIONS  (PRN):  acetaminophen     Tablet .. 650 milliGRAM(s) Oral every 6 hours PRN Temp greater or equal to 38C (100.4F), Severe Pain (7 - 10)      Vital Signs Last 24 Hrs  T(C): 37.4 (2022 06:55), Max: 39.4 (2022 19:53)  T(F): 99.3 (2022 06:55), Max: 102.9 (2022 19:53)  HR: 94 (2022 06:55) (83 - 118)  BP: 126/61 (2022 06:55) (122/74 - 132/60)  BP(mean): --  RR: 20 (2022 06:55) (20 - 22)  SpO2: 97% (2022 06:55) (96% - 100%)  CAPILLARY BLOOD GLUCOSE        I&O's Summary      PHYSICAL EXAM:  GENERAL: NAD, well-developed  HEAD:  Atraumatic, Normocephalic  EYES: EOMI, PERRLA, conjunctiva and sclera clear  NECK: Supple, No JVD  CHEST/LUNG: no use of accessory muscles, No wheeze, R upper Ronchi +   HEART: Regular rate and rhythm; No murmurs, rubs, or gallops  ABDOMEN: Soft, Nontender, Nondistended; Bowel sounds present  EXTREMITIES:  2+ Peripheral Pulses, No clubbing, cyanosis, or edema  PSYCH: AAOx3  NEUROLOGY: non-focal  SKIN: No rashes or lesions    LABS:                        13.5   18.76 )-----------( 168      ( 2022 06:28 )             42.2         134<L>  |  99  |  10  ----------------------------<  151<H>  3.6   |  24  |  0.60    Ca    8.4      2022 06:28  Phos  1.7       Mg     2.10         TPro  6.8  /  Alb  3.6  /  TBili  0.3  /  DBili  x   /  AST  18  /  ALT  20  /  AlkPhos  84            Urinalysis Basic - ( 2022 23:40 )    Color: Yellow / Appearance: Clear / S.019 / pH: x  Gluc: x / Ketone: Negative  / Bili: Negative / Urobili: <2 mg/dL   Blood: x / Protein: Negative / Nitrite: Negative   Leuk Esterase: Negative / RBC: x / WBC x   Sq Epi: x / Non Sq Epi: x / Bacteria: x        RADIOLOGY & ADDITIONAL TESTS:  < from: Xray Chest 1 View- PORTABLE-Urgent (22 @ 00:13) >    FINDINGS:  Heart/Vascular: Heart size is normal  Pulmonary: The lungs are clear. No pleural effusion. No pneumothorax.  Bones: No acute osseous abnormalities    IMPRESSION:  Clear lungs.    < end of copied text >  < from: CT Chest No Cont (22 @ 09:00) >  FINDINGS:    LYMPH NODES: No lymphadenopathy.    HEART/VASCULATURE: Heart size is normal.    AIRWAYS/LUNGS/PLEURA:Right upper lobe clustered nodular and groundglass   opacities in a bronchovascular distribution.    UPPER ABDOMEN: Within normal limits.    BONES/SOFT TISSUES: Multilevel degenerative changes of the spine.    IMPRESSION:    Right upper lobe tree-in-bud and groundglass opacities may be compatible   with endobronchial spread of infection. Follow-up recommended in 4-6   weeks to ensure resolution.    < end of copied text >    Imaging Personally Reviewed:    Consultant(s) Notes Reviewed:      Care Discussed with Consultants/Other Providers:

## 2022-06-23 NOTE — CONSULT NOTE ADULT - ATTENDING COMMENTS
56 year old with recent viral syndrome  Presents with fever, cough chills and diarrhea    Imaging suggestive of pneumonia    Can continue azithromycin pending urine legionella  Can change zosyn to Ceftriaxone 1 gram iv daily  Glenn stop vancomycin    Check HIV status

## 2022-06-23 NOTE — PROGRESS NOTE ADULT - PROBLEM SELECTOR PLAN 1
Sepsis ( leukocytosis, tachycardia, tachypnea, fever) due to PNA: Present on admission   - Patient with likely viral illness at first a few weeks back; +sick contacts at home  - Now symptoms consistent with possible secondary bacterial PNA with sepsis present   - WBC 15.5, tachypnea in mid 20s, sinus tachycardia, lactate 2.5->1.5 and blood tinged yellow sputum - no [place] hemoptysis   - RVP negative x 2, BCx and UCx pending in lab  - CT chest shows right upper lobe tree-in-bud and groundglass opacities may be compatible   with endobronchial spread of infection  - will check sputum culture, check MRSA PCR, and urine legionella/strep pneumo   - will check quantiferon gold in AM - though low suspicion for active TB at this time   - discussed CT findings with radiology - imaging not typical of TB infection; will hold off on respiratory isolation  - s/p CTX and Azithro in ED - given severity of illness, will broaden to Vanc 1gm q12 and Zosyn q8h and continue Azithro 500 IV q24h pending legionella results  - saturating 96-97% on room air at time of my interview, continue supplemental O2 with NC as needed   - if any decompensation or failure to improve on above regimen will consider ID and pulm consultation  - continue robitussin and tessalon ATC x 2 days for now Sepsis  ( leukocytosis, tachycardia, tachypnea, fever) due to PNA: Present on admission   - Patient with likely viral illness at first a few weeks back; +sick contacts at home  - Now symptoms consistent with possible secondary bacterial PNA with sepsis present   - WBC 15.5, tachypnea in mid 20s, sinus tachycardia, lactate 2.5->1.5 and blood tinged yellow sputum - no [place] hemoptysis   - RVP negative x 2, BCx and UCx pending in lab  - CT chest shows right upper lobe tree-in-bud and groundglass opacities may be compatible   with endobronchial spread of infection  - will check sputum culture, check MRSA PCR, and urine legionella/strep pneumo   -blood cx and Urine cx NGTD   - will check quantiferon gold in AM - though low suspicion for active TB at this time   - Admitting physician discussed CT findings with radiology - imaging not typical of TB infection; will hold off on respiratory isolation  - s/p CTX and Azithro in ED - given severity of illness, ABx  broadened  to Vanc 1gm q12 and Zosyn q8h and continue Azithro 500 IV q24h pending legionella results  - saturating 96-97% on room air at time of my interview, continue supplemental O2 with NC as needed   - pt febrile to 102.9, worsening Leucocytosis, worsening bandemia ,elevated procalcitonin ,concerning for gram negative Pneumonia ,  will request ID eval   - continue robitussin and tessalon ATC x 2 days for now

## 2022-06-23 NOTE — PROGRESS NOTE ADULT - PROBLEM SELECTOR PLAN 2
- DVT ppx: Lovenox 40mg daily for ppx mild hyponatremia: will CTM  hypophosphatemia: will supplement , monitor BMP

## 2022-06-23 NOTE — CONSULT NOTE ADULT - SUBJECTIVE AND OBJECTIVE BOX
Patient is a 56y old  Male who presents with a chief complaint of Patient admitted with shortness of breath, subjective fever, cough (2022 10:11)    HPI: Mr. Cast is a 56 year old gentleman with no reported PMHx who reported feeling sick with cough and bodyaches for about 2 weeks. Around Thursday of last week () he started spiking fevers (felt hot to touch) with chills. His cough became productive of yellow blood tinged sputum. He endorses having intermittent headache, pleuritic chest pain with deep breath and coughing, nausea but no vomiting, decreased appetite, and sore throat. Of note, he lives with his wife and their daughter and 2 young children. About 1 month ago, their grandchildren got sick, which later spread to his wife and daughter, and then he caught it. They all recovered normally but he continued to get worse as discussed above. He otherwise denied having any CP at time of interview, no abdominal pain, changes in hearing or vision, focal weakness, trouble ambulating, recent travel, dysuria, or vomitting/diarrhea/constipation.   In the ED he was found to be febrile to 102, WBC of 15k with 9% bands, tachycardiac, tachypneic, and CT chest was performed showing right upper lobe tree-in-bud and ground glass opacities may be compatible with endobronchial spread of infection. Pt was started on CTX and Azithro which was broadened to Vanc/Zosyn and Azithro. ID now consulted for the same.        REVIEW OF SYSTEMS  [  ] ROS unobtainable because:    [ x ] All other systems negative except as noted below    Constitutional:  [ ] fever [ ] chills  [ ] weight loss  [ ]night sweat  [ ]poor appetite/PO intake [ ]fatigue   Skin:  [ ] rash [ ] phlebitis	  Eyes: [ ] icterus [ ] pain  [ ] discharge	  ENMT: [ ] sore throat  [ ] thrush [ ] ulcers [ ] exudates [ ]anosmia  Respiratory: [ ] dyspnea [ ] hemoptysis [ ] cough [ ] sputum	  Cardiovascular:  [ ] chest pain [ ] palpitations [ ] edema	  Gastrointestinal:  [ ] nausea [ ] vomiting [ ] diarrhea [ ] constipation [ ] pain	  Genitourinary:  [ ] dysuria [ ] frequency [ ] hematuria [ ] discharge [ ] flank pain  [ ] incontinence  Musculoskeletal:  [ ] myalgias [ ] arthralgias [ ] arthritis  [ ] back pain  Neurological:  [ ] headache [ ] weakness [ ] seizures  [ ] confusion/altered mental status    prior hospital charts reviewed [V]  primary team notes reviewed [V]  other consultant notes reviewed [V]    PAST MEDICAL & SURGICAL HISTORY:  No pertinent past medical history      History of broken nose  s/p surgical repair many years ago          SOCIAL HISTORY:  - Denied smoking/vaping/alcohol/recreational drug use    FAMILY HISTORY:  FH: diabetes mellitus (Mother)        Allergies  No Known Allergies        ANTIMICROBIALS:  azithromycin  IVPB 500 every 24 hours  piperacillin/tazobactam IVPB.. 3.375 every 8 hours  vancomycin  IVPB 1000 every 12 hours      ANTIMICROBIALS (past 90 days):  MEDICATIONS  (STANDING):  azithromycin  IVPB   255 mL/Hr IV Intermittent (22 @ 10:55)    azithromycin  IVPB   255 mL/Hr IV Intermittent (22 @ 11:13)    cefTRIAXone   IVPB   100 mL/Hr IV Intermittent (22 @ 03:58)    piperacillin/tazobactam IVPB.   200 mL/Hr IV Intermittent (22 @ 15:04)    piperacillin/tazobactam IVPB..   25 mL/Hr IV Intermittent (22 @ 07:00)   25 mL/Hr IV Intermittent (22 @ 23:29)    vancomycin  IVPB   250 mL/Hr IV Intermittent (22 @ 05:37)   250 mL/Hr IV Intermittent (22 @ 15:52)        OTHER MEDS:   MEDICATIONS  (STANDING):  acetaminophen     Tablet .. 650 every 6 hours PRN  benzonatate 100 every 8 hours  enoxaparin Injectable 40 every 24 hours  guaifenesin/dextromethorphan Oral Liquid 10 every 6 hours  influenza   Vaccine 0.5 once      VITALS:  Vital Signs Last 24 Hrs  T(F): 99.3 (22 @ 06:55), Max: 102.9 (22 @ 19:53)    Vital Signs Last 24 Hrs  HR: 94 (22 @ 06:55) (83 - 118)  BP: 126/61 (22 @ 06:55) (125/68 - 132/60)  RR: 18 (22 @ 12:27)  SpO2: 95% (22 @ 12:27) (95% - 100%)  Wt(kg): --    EXAM:    GA: NAD, AOx3  HEENT: oral cavity no lesion  CV: nl S1/S2, no RMG  Lungs: CTAB, No distress  Abd: BS+, soft, nontender, no rebounding pain  Ext: no edema  Neuro: No focal deficits  Skin: Intact  IV: no phlebitis    Labs:                        13.5   18.76 )-----------( 168      ( 2022 06:28 )             42.2         134<L>  |  99  |  10  ----------------------------<  151<H>  3.6   |  24  |  0.60    Ca    8.4      2022 06:28  Phos  1.7       Mg     2.10         TPro  6.8  /  Alb  3.6  /  TBili  0.3  /  DBili  x   /  AST  18  /  ALT  20  /  AlkPhos  84        WBC Trend:  WBC Count: 18.76 (22 @ 06:28)  WBC Count: 19.50 (22 @ 14:08)  WBC Count: 15.54 (22 @ 23:40)      Auto Neutrophil #: 15.98 K/uL (22 @ 06:28)  Band Neutrophils %: 9.6 % (22 @ 06:28)  Auto Neutrophil #: 12.97 K/uL (22 @ 23:40)      Creatine Trend:  Creatinine, Serum: 0.60 ()  Creatinine, Serum: 0.64 ()  Creatinine, Serum: 0.78 (21)      Liver Biochemical Testing Trend:  Alanine Aminotransferase (ALT/SGPT): 20 ()  Alanine Aminotransferase (ALT/SGPT): 21 ()  Aspartate Aminotransferase (AST/SGOT): 18 (22 @ 06:28)  Aspartate Aminotransferase (AST/SGOT): 16 (22 @ 23:40)  Bilirubin Total, Serum: 0.3 ()  Bilirubin Total, Serum: 0.5 ()      Trend LDH      Auto Eosinophil %: 0.0 % (22 @ 06:28)  Auto Eosinophil %: 0.0 % (22 @ 23:40)      Urinalysis Basic - ( 2022 23:40 )    Color: Yellow / Appearance: Clear / S.019 / pH: x  Gluc: x / Ketone: Negative  / Bili: Negative / Urobili: <2 mg/dL   Blood: x / Protein: Negative / Nitrite: Negative   Leuk Esterase: Negative / RBC: x / WBC x   Sq Epi: x / Non Sq Epi: x / Bacteria: x        MICROBIOLOGY:        Culture - Sputum (collected 2022 16:24)  Source: .Sputum Sputum    Culture - Blood (collected 2022 23:40)  Source: .Blood Blood-Peripheral  Preliminary Report:    No growth to date.    Culture - Urine (collected 2022 23:00)  Source: Clean Catch Clean Catch (Midstream)  Final Report:    <10,000 CFU/mL Normal Urogenital Sultana    Culture - Blood (collected 2022 23:00)  Source: .Blood Blood-Peripheral  Preliminary Report:    No growth to date.    Rapid RVP Result: NotDetec ( @ 04:02)  Rapid RVP Result: NotDetec ( @ 23:40)  Procalcitonin, Serum: 1.66 ()    Serum Pro-Brain Natriuretic Peptide: 96 ()    Troponin T, High Sensitivity Result: 7 ()    Lactate, Blood: 1.5 ( @ 02:33)  Blood Gas Venous - Lactate: 2.5 ( @ 23:40)        RADIOLOGY:  imaging below personally reviewed      < from: Xray Chest 1 View- PORTABLE-Urgent (22 @ 00:13) >  Clear lungs.    < end of copied text >        < from: CT Chest No Cont (22 @ 09:00) >  Right upper lobe clustered nodular and groundglass  opacities in a bronchovascular distribution.  may be compatible  with endobronchial spread of infection   < end of copied text >   Patient is a 56y old  Male who presents with a chief complaint of Patient admitted with shortness of breath, subjective fever, cough (2022 10:11)    HPI: Mr. Cast is a 56 year old gentleman with no reported PMHx who reported feeling sick with cough and bodyaches for about 2 weeks. Around Thursday of last week () he started spiking fevers (felt hot to touch) with chills. His cough became productive of yellow blood tinged sputum. He endorses having intermittent headache, pleuritic chest pain with deep breath and coughing, nausea but no vomiting, decreased appetite, and sore throat. Of note, he lives with his wife and their daughter and 2 young children. About 1 month ago, their grandchildren got sick, which later spread to his wife and daughter, and then he caught it. They all recovered normally but he continued to get worse as discussed above.    In the ED he was found to be febrile to 102, WBC of 15k with 9% bands, tachycardiac, tachypneic, and CT chest was performed showing right upper lobe tree-in-bud and ground glass opacities may be compatible with endobronchial spread of infection. Pt was started on CTX and Azithro which was broadened to Vanc/Zosyn and Azithro. ID now consulted for the same. Pt was born in [place] and came to [place] 20 years back. 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 No one sick at work. NO recent travel and no sick contacts. Reports feeling better since coming to hospital. Reports he has some throat pain and chest pain associated with coughing and now his cough is a little blood tinged. He also endorsed diarrhea 3-4 episodes of watery diarrhea since the last few days.        REVIEW OF SYSTEMS  [  ] ROS unobtainable because:    [ x ] All other systems negative except as noted below    Constitutional:  [ ] fever [ ] chills  [ ] weight loss  [ ]night sweat  [ ]poor appetite/PO intake [ ]fatigue   Skin:  [ ] rash [ ] phlebitis	  Eyes: [ ] icterus [ ] pain  [ ] discharge	  ENMT: [ ] sore throat  [ ] thrush [ ] ulcers [ ] exudates [ ]anosmia  Respiratory: [ ] dyspnea [ ] hemoptysis [ ] cough [ ] sputum	  Cardiovascular:  [ ] chest pain [ ] palpitations [ ] edema	  Gastrointestinal:  [ ] nausea [ ] vomiting [ ] diarrhea [ ] constipation [ ] pain	  Genitourinary:  [ ] dysuria [ ] frequency [ ] hematuria [ ] discharge [ ] flank pain  [ ] incontinence  Musculoskeletal:  [ ] myalgias [ ] arthralgias [ ] arthritis  [ ] back pain  Neurological:  [ ] headache [ ] weakness [ ] seizures  [ ] confusion/altered mental status    prior hospital charts reviewed [V]  primary team notes reviewed [V]  other consultant notes reviewed [V]    PAST MEDICAL & SURGICAL HISTORY:  No pertinent past medical history      History of broken nose  s/p surgical repair many years ago          SOCIAL HISTORY:  - Denied smoking/vaping/alcohol/recreational drug use    FAMILY HISTORY:  FH: diabetes mellitus (Mother)        Allergies  No Known Allergies        ANTIMICROBIALS:  azithromycin  IVPB 500 every 24 hours  piperacillin/tazobactam IVPB.. 3.375 every 8 hours  vancomycin  IVPB 1000 every 12 hours      ANTIMICROBIALS (past 90 days):  MEDICATIONS  (STANDING):  azithromycin  IVPB   255 mL/Hr IV Intermittent (22 @ 10:55)    azithromycin  IVPB   255 mL/Hr IV Intermittent (22 @ 11:13)    cefTRIAXone   IVPB   100 mL/Hr IV Intermittent (22 @ 03:58)    piperacillin/tazobactam IVPB.   200 mL/Hr IV Intermittent (22 @ 15:04)    piperacillin/tazobactam IVPB..   25 mL/Hr IV Intermittent (22 @ 07:00)   25 mL/Hr IV Intermittent (22 @ 23:29)    vancomycin  IVPB   250 mL/Hr IV Intermittent (22 @ 05:37)   250 mL/Hr IV Intermittent (22 @ 15:52)        OTHER MEDS:   MEDICATIONS  (STANDING):  acetaminophen     Tablet .. 650 every 6 hours PRN  benzonatate 100 every 8 hours  enoxaparin Injectable 40 every 24 hours  guaifenesin/dextromethorphan Oral Liquid 10 every 6 hours  influenza   Vaccine 0.5 once      VITALS:  Vital Signs Last 24 Hrs  T(F): 99.3 (22 @ 06:55), Max: 102.9 (22 @ 19:53)    Vital Signs Last 24 Hrs  HR: 94 (22 @ 06:55) (83 - 118)  BP: 126/61 (22 @ 06:55) (125/68 - 132/60)  RR: 18 (22 @ 12:27)  SpO2: 95% (22 @ 12:27) (95% - 100%)  Wt(kg): --    EXAM:    Constitutional: Not in acute distress  Eyes: pupils bilaterally reactive to light. No icterus.  Oral cavity: Clear, no lesions  Neck: No neck vein distension noted  RS: Chest clear to auscultation bilaterally. No wheeze/rhonchi/crepitations.  CVS: S1, S2 heard. Regular rate and rhythm. No murmurs/rubs/gallops.  Abdomen: Soft. No guarding/rigidity/tenderness.  : No acute abnormalities  Extremities: Warm. No pedal edema  Skin: No lesions noted  Vascular: No evidence of phlebitis  Neuro: Alert, oriented to time/place/person  Cranial nerves 2-12 grossly normal. No focal abnormalities      Labs:                        13.5   18.76 )-----------( 168      ( 2022 06:28 )             42.2         134<L>  |  99  |  10  ----------------------------<  151<H>  3.6   |  24  |  0.60    Ca    8.4      2022 06:28  Phos  1.7       Mg     2.10         TPro  6.8  /  Alb  3.6  /  TBili  0.3  /  DBili  x   /  AST  18  /  ALT  20  /  AlkPhos  84        WBC Trend:  WBC Count: 18.76 (22 @ 06:28)  WBC Count: 19.50 (22 @ 14:08)  WBC Count: 15.54 (22 @ 23:40)      Auto Neutrophil #: 15.98 K/uL (22 @ 06:28)  Band Neutrophils %: 9.6 % (22 @ 06:28)  Auto Neutrophil #: 12.97 K/uL (22 @ 23:40)      Creatine Trend:  Creatinine, Serum: 0.60 ()  Creatinine, Serum: 0.64 ()  Creatinine, Serum: 0.78 ()      Liver Biochemical Testing Trend:  Alanine Aminotransferase (ALT/SGPT): 20 ()  Alanine Aminotransferase (ALT/SGPT): 21 ()  Aspartate Aminotransferase (AST/SGOT): 18 (22 @ 06:28)  Aspartate Aminotransferase (AST/SGOT): 16 (22 @ 23:40)  Bilirubin Total, Serum: 0.3 ()  Bilirubin Total, Serum: 0.5 ()      Trend LDH      Auto Eosinophil %: 0.0 % (22 @ 06:28)  Auto Eosinophil %: 0.0 % (22 @ 23:40)      Urinalysis Basic - ( 2022 23:40 )    Color: Yellow / Appearance: Clear / S.019 / pH: x  Gluc: x / Ketone: Negative  / Bili: Negative / Urobili: <2 mg/dL   Blood: x / Protein: Negative / Nitrite: Negative   Leuk Esterase: Negative / RBC: x / WBC x   Sq Epi: x / Non Sq Epi: x / Bacteria: x        MICROBIOLOGY:        Culture - Sputum (collected 2022 16:24)  Source: .Sputum Sputum    Culture - Blood (collected 2022 23:40)  Source: .Blood Blood-Peripheral  Preliminary Report:    No growth to date.    Culture - Urine (collected 2022 23:00)  Source: Clean Catch Clean Catch (Midstream)  Final Report:    <10,000 CFU/mL Normal Urogenital Sultana    Culture - Blood (collected 2022 23:00)  Source: .Blood Blood-Peripheral  Preliminary Report:    No growth to date.    Rapid RVP Result: NotDetec ( @ 04:02)  Rapid RVP Result: NotDetec ( @ 23:40)  Procalcitonin, Serum: 1.66 ()    Serum Pro-Brain Natriuretic Peptide: 96 ()    Troponin T, High Sensitivity Result: 7 ()    Lactate, Blood: 1.5 ( @ 02:33)  Blood Gas Venous - Lactate: 2.5 ( @ 23:40)        RADIOLOGY:  imaging below personally reviewed      < from: Xray Chest 1 View- PORTABLE-Urgent (22 @ 00:13) >  Clear lungs.    < end of copied text >        < from: CT Chest No Cont (22 @ 09:00) >  Right upper lobe clustered nodular and groundglass  opacities in a bronchovascular distribution.  may be compatible  with endobronchial spread of infection   < end of copied text >

## 2022-06-23 NOTE — CONSULT NOTE ADULT - ASSESSMENT
Mr. Cast is a 56 year old gentleman with no reported PMHx who reported feeling sick with cough and bodyaches for about 2 weeks. Around Thursday of last week (6/16) he started spiking fevers (felt hot to touch) with chills. His cough became productive of yellow blood tinged sputum. He endorses having intermittent headache, pleuritic chest pain with deep breath and coughing, nausea but no vomiting, decreased appetite, and sore throat. Of note, he lives with his wife and their daughter and 2 young children. About 1 month ago, their grandchildren got sick, which later spread to his wife and daughter, and then he caught it. They all recovered normally but he continued to get worse as discussed above.   In the ED he was found to be febrile to 102, WBC of 15k with 9% bands, tachycardiac, tachypneic, and CT chest was performed showing right upper lobe tree-in-bud and ground glass opacities may be compatible with endobronchial spread of infection. Pt was started on CTX and Azithro which was broadened to Vanc/Zosyn and Azithro. ID now consulted for the same.     WORKUP  Xray Chest  (06.22): Clear lungs.  RVP negative x 2  CT Chest No Cont (06.22): Right upper lobe clustered nodular and groundglass  opacities in a bronchovascular distribution.  may be compatible  with endobronchial spread of infection   Procal 1.66  Blood cx negative  UA: negative    DIAGNOSIS and IMPRESSION  # Pneumonia  Leukocytosis of 15k with 9.6% bands with fever to 102.9  Pt currently on azithromycin  IVPB 500 every 24 hours, Zosyn 3.375 every 8 hours and Vanc 1gm every 12 hours      RECOMMENDATIONS  Sputum cx pending -> Will f/u  MRSA PCR, Strep Pneumo Ag, Ur.Legionella Ag and Quant Gold Ordered -> Will f/u    PT TO BE SEEN. PRELIM NOTE  PENDING RECS. PLEASE WAIT FOR FINAL RECS AFTER DISCUSSION WITH ATTENDING#    Jose Armando Monk MD, PGY4   ID fellow  [employer] Teams Preferred  After 5pm/weekends call [phone]   Mr. Cast is a 56 year old gentleman with no reported PMHx who reported feeling sick with cough and bodyaches for about 2 weeks. Around Thursday of last week (6/16) he started spiking fevers (felt hot to touch) with chills. His cough became productive of yellow blood tinged sputum. He endorses having intermittent headache, pleuritic chest pain with deep breath and coughing, nausea but no vomiting, decreased appetite, and sore throat. Of note, he lives with his wife and their daughter and 2 young children. About 1 month ago, their grandchildren got sick, which later spread to his wife and daughter, and then he caught it. They all recovered normally but he continued to get worse as discussed above.   In the ED he was found to be febrile to 102, WBC of 15k with 9% bands, tachycardiac, tachypneic, and CT chest was performed showing right upper lobe tree-in-bud and ground glass opacities may be compatible with endobronchial spread of infection. Pt was started on CTX and Azithro which was broadened to Vanc/Zosyn and Azithro. ID now consulted for the same.     WORKUP  Xray Chest  (06.22): Clear lungs.  RVP negative x 2  CT Chest No Cont (06.22): Right upper lobe clustered nodular and groundglass  opacities in a bronchovascular distribution.  may be compatible  with endobronchial spread of infection   Procal 1.66  Blood cx negative  UA: negative    DIAGNOSIS and IMPRESSION  # Pneumonia  Leukocytosis of 15k with 9.6% bands with fever to 102.9  Pt currently on azithromycin  IVPB 500 every 24 hours, Zosyn 3.375 every 8 hours and Vanc 1gm every 12 hours  Low suspicion for TB. Likely secondary bacterial infection    RECOMMENDATIONS  Sputum cx pending -> Will f/u  MRSA PCR, Strep Pneumo Ag, Ur.Legionella Ag and Quant Gold Ordered -> Will f/u    PT TO BE SEEN. PRELIM NOTE  PENDING RECS. PLEASE WAIT FOR FINAL RECS AFTER DISCUSSION WITH ATTENDING#    Jose Armando Monk MD, PGY4   ID fellow  [employer] Teams Preferred  After 5pm/weekends call [phone]   Mr. Cast is a 56 year old gentleman with no reported PMHx who reported feeling sick with cough and bodyaches for about 2 weeks. Around Thursday of last week (6/16) he started spiking fevers (felt hot to touch) with chills. His cough became productive of yellow blood tinged sputum. He endorses having intermittent headache, pleuritic chest pain with deep breath and coughing, nausea but no vomiting, decreased appetite, and sore throat. Of note, he lives with his wife and their daughter and 2 young children. About 1 month ago, their grandchildren got sick, which later spread to his wife and daughter, and then he caught it. They all recovered normally but he continued to get worse as discussed above.   In the ED he was found to be febrile to 102, WBC of 15k with 9% bands, tachycardiac, tachypneic, and CT chest was performed showing right upper lobe tree-in-bud and ground glass opacities may be compatible with endobronchial spread of infection. Pt was started on CTX and Azithro which was broadened to Vanc/Zosyn and Azithro. ID now consulted for the same.     WORKUP  Xray Chest  (06.22): Clear lungs.  RVP negative x 2  CT Chest No Cont (06.22): Right upper lobe clustered nodular and groundglass  opacities in a bronchovascular distribution.  may be compatible  with endobronchial spread of infection   Procal 1.66  Blood cx negative  UA: negative    DIAGNOSIS and IMPRESSION  # Pneumonia  Leukocytosis of 15k with 9.6% bands with fever to 102.9  Pt currently on azithromycin  IVPB 500 every 24 hours, Zosyn 3.375 every 8 hours and Vanc 1gm every 12 hours  Low suspicion for TB. Likely secondary bacterial infection    RECOMMENDATIONS  Sputum cx pending -> Will f/u  MRSA PCR, Strep Pneumo Ag, Ur.Legionella Ag and Quant Gold Ordered -> Will f/u  Can continue azithromycin pending urine legionella  Can change zosyn to Ceftriaxone 1 gram iv daily  Glenn stop vancomycin  Check HIV status    Pt seen and examined Case d/w attending and primary team    Jose Armando Monk MD, PGY4   ID fellow  [employer] Teams Preferred  After 5pm/weekends call [phone]

## 2022-06-24 VITALS
DIASTOLIC BLOOD PRESSURE: 74 MMHG | RESPIRATION RATE: 18 BRPM | OXYGEN SATURATION: 100 % | HEART RATE: 94 BPM | TEMPERATURE: 98 F | SYSTOLIC BLOOD PRESSURE: 118 MMHG

## 2022-06-24 LAB
ALBUMIN SERPL ELPH-MCNC: 3.6 G/DL — SIGNIFICANT CHANGE UP (ref 3.3–5)
ALP SERPL-CCNC: 89 U/L — SIGNIFICANT CHANGE UP (ref 40–120)
ALT FLD-CCNC: 30 U/L — SIGNIFICANT CHANGE UP (ref 4–41)
ANION GAP SERPL CALC-SCNC: 11 MMOL/L — SIGNIFICANT CHANGE UP (ref 7–14)
AST SERPL-CCNC: 22 U/L — SIGNIFICANT CHANGE UP (ref 4–40)
BASOPHILS # BLD AUTO: 0.05 K/UL — SIGNIFICANT CHANGE UP (ref 0–0.2)
BASOPHILS NFR BLD AUTO: 0.5 % — SIGNIFICANT CHANGE UP (ref 0–2)
BILIRUB SERPL-MCNC: 0.3 MG/DL — SIGNIFICANT CHANGE UP (ref 0.2–1.2)
BUN SERPL-MCNC: 9 MG/DL — SIGNIFICANT CHANGE UP (ref 7–23)
CALCIUM SERPL-MCNC: 9 MG/DL — SIGNIFICANT CHANGE UP (ref 8.4–10.5)
CHLORIDE SERPL-SCNC: 103 MMOL/L — SIGNIFICANT CHANGE UP (ref 98–107)
CO2 SERPL-SCNC: 24 MMOL/L — SIGNIFICANT CHANGE UP (ref 22–31)
CREAT SERPL-MCNC: 0.56 MG/DL — SIGNIFICANT CHANGE UP (ref 0.5–1.3)
CULTURE RESULTS: SIGNIFICANT CHANGE UP
EGFR: 117 ML/MIN/1.73M2 — SIGNIFICANT CHANGE UP
EOSINOPHIL # BLD AUTO: 0.32 K/UL — SIGNIFICANT CHANGE UP (ref 0–0.5)
EOSINOPHIL NFR BLD AUTO: 3.3 % — SIGNIFICANT CHANGE UP (ref 0–6)
GLUCOSE SERPL-MCNC: 110 MG/DL — HIGH (ref 70–99)
HCT VFR BLD CALC: 43.2 % — SIGNIFICANT CHANGE UP (ref 39–50)
HGB BLD-MCNC: 13.8 G/DL — SIGNIFICANT CHANGE UP (ref 13–17)
HIV 1+2 AB+HIV1 P24 AG SERPL QL IA: SIGNIFICANT CHANGE UP
IANC: 6.2 K/UL — SIGNIFICANT CHANGE UP (ref 1.8–7.4)
IMM GRANULOCYTES NFR BLD AUTO: 0.6 % — SIGNIFICANT CHANGE UP (ref 0–1.5)
LEGIONELLA AG UR QL: NEGATIVE — SIGNIFICANT CHANGE UP
LYMPHOCYTES # BLD AUTO: 1.89 K/UL — SIGNIFICANT CHANGE UP (ref 1–3.3)
LYMPHOCYTES # BLD AUTO: 19.7 % — SIGNIFICANT CHANGE UP (ref 13–44)
MAGNESIUM SERPL-MCNC: 2.2 MG/DL — SIGNIFICANT CHANGE UP (ref 1.6–2.6)
MCHC RBC-ENTMCNC: 28.3 PG — SIGNIFICANT CHANGE UP (ref 27–34)
MCHC RBC-ENTMCNC: 31.9 GM/DL — LOW (ref 32–36)
MCV RBC AUTO: 88.5 FL — SIGNIFICANT CHANGE UP (ref 80–100)
MONOCYTES # BLD AUTO: 1.09 K/UL — HIGH (ref 0–0.9)
MONOCYTES NFR BLD AUTO: 11.3 % — SIGNIFICANT CHANGE UP (ref 2–14)
MRSA PCR RESULT.: SIGNIFICANT CHANGE UP
NEUTROPHILS # BLD AUTO: 6.2 K/UL — SIGNIFICANT CHANGE UP (ref 1.8–7.4)
NEUTROPHILS NFR BLD AUTO: 64.6 % — SIGNIFICANT CHANGE UP (ref 43–77)
NRBC # BLD: 0 /100 WBCS — SIGNIFICANT CHANGE UP
NRBC # FLD: 0 K/UL — SIGNIFICANT CHANGE UP
PHOSPHATE SERPL-MCNC: 2.9 MG/DL — SIGNIFICANT CHANGE UP (ref 2.5–4.5)
PLATELET # BLD AUTO: 178 K/UL — SIGNIFICANT CHANGE UP (ref 150–400)
POTASSIUM SERPL-MCNC: 3.2 MMOL/L — LOW (ref 3.5–5.3)
POTASSIUM SERPL-SCNC: 3.2 MMOL/L — LOW (ref 3.5–5.3)
PROT SERPL-MCNC: 7.3 G/DL — SIGNIFICANT CHANGE UP (ref 6–8.3)
RBC # BLD: 4.88 M/UL — SIGNIFICANT CHANGE UP (ref 4.2–5.8)
RBC # FLD: 14.6 % — HIGH (ref 10.3–14.5)
S AUREUS DNA NOSE QL NAA+PROBE: DETECTED
S PNEUM AG UR QL: NEGATIVE — SIGNIFICANT CHANGE UP
SODIUM SERPL-SCNC: 138 MMOL/L — SIGNIFICANT CHANGE UP (ref 135–145)
SPECIMEN SOURCE: SIGNIFICANT CHANGE UP
WBC # BLD: 9.61 K/UL — SIGNIFICANT CHANGE UP (ref 3.8–10.5)
WBC # FLD AUTO: 9.61 K/UL — SIGNIFICANT CHANGE UP (ref 3.8–10.5)

## 2022-06-24 PROCEDURE — 99239 HOSP IP/OBS DSCHRG MGMT >30: CPT

## 2022-06-24 PROCEDURE — 99232 SBSQ HOSP IP/OBS MODERATE 35: CPT

## 2022-06-24 RX ORDER — POTASSIUM CHLORIDE 20 MEQ
40 PACKET (EA) ORAL ONCE
Refills: 0 | Status: COMPLETED | OUTPATIENT
Start: 2022-06-24 | End: 2022-06-24

## 2022-06-24 RX ORDER — MUPIROCIN 20 MG/G
1 OINTMENT TOPICAL
Refills: 0 | Status: DISCONTINUED | OUTPATIENT
Start: 2022-06-24 | End: 2022-06-24

## 2022-06-24 RX ADMIN — Medication 40 MILLIEQUIVALENT(S): at 11:18

## 2022-06-24 RX ADMIN — Medication 10 MILLILITER(S): at 12:23

## 2022-06-24 RX ADMIN — MUPIROCIN 1 APPLICATION(S): 20 OINTMENT TOPICAL at 06:30

## 2022-06-24 RX ADMIN — AZITHROMYCIN 255 MILLIGRAM(S): 500 TABLET, FILM COATED ORAL at 11:18

## 2022-06-24 RX ADMIN — Medication 100 MILLIGRAM(S): at 06:04

## 2022-06-24 RX ADMIN — Medication 10 MILLILITER(S): at 06:04

## 2022-06-24 NOTE — PROGRESS NOTE ADULT - PROBLEM SELECTOR PLAN 3
- DVT ppx: Lovenox 40mg daily for ppx - DVT ppx: Lovenox 40mg daily for ppx  DC planning if OK with ID   QUANT GOLD PENDING   plan of care d/w pt and ACP

## 2022-06-24 NOTE — DISCHARGE NOTE PROVIDER - NSFOLLOWUPCLINICS_GEN_ALL_ED_FT
[facility] Pulmonolgy and Sleep Medicine  Pulmonology  [address], [address]  [place] [address]  Phone: [phone]  Fax:     [facility]  Internal Medicine  [address] [place] [address]  Phone: [phone]  Fax: [phone]

## 2022-06-24 NOTE — PROGRESS NOTE ADULT - SUBJECTIVE AND OBJECTIVE BOX
Patient is a 56y old  Male who presents with a chief complaint of Patient admitted with shortness of breath, subjective fever, cough (23 Jun 2022 14:28)      SUBJECTIVE / OVERNIGHT EVENTS:    MEDICATIONS  (STANDING):  azithromycin  IVPB 500 milliGRAM(s) IV Intermittent every 24 hours  benzonatate 100 milliGRAM(s) Oral every 8 hours  cefTRIAXone   IVPB      cefTRIAXone   IVPB 1000 milliGRAM(s) IV Intermittent every 24 hours  enoxaparin Injectable 40 milliGRAM(s) SubCutaneous every 24 hours  guaifenesin/dextromethorphan Oral Liquid 10 milliLiter(s) Oral every 6 hours  influenza   Vaccine 0.5 milliLiter(s) IntraMuscular once  mupirocin 2% Ointment 1 Application(s) Topical two times a day  potassium chloride   Powder 40 milliEquivalent(s) Oral once  sodium chloride 0.9%. 1000 milliLiter(s) (75 mL/Hr) IV Continuous <Continuous>    MEDICATIONS  (PRN):  acetaminophen     Tablet .. 650 milliGRAM(s) Oral every 6 hours PRN Temp greater or equal to 38C (100.4F), Severe Pain (7 - 10)      Vital Signs Last 24 Hrs  T(C): 37.1 (24 Jun 2022 04:30), Max: 37.1 (23 Jun 2022 20:34)  T(F): 98.7 (24 Jun 2022 04:30), Max: 98.8 (23 Jun 2022 20:34)  HR: 80 (24 Jun 2022 04:30) (80 - 100)  BP: 145/87 (24 Jun 2022 04:30) (116/66 - 145/87)  BP(mean): --  RR: 18 (24 Jun 2022 04:30) (18 - 18)  SpO2: 100% (24 Jun 2022 04:30) (94% - 100%)  CAPILLARY BLOOD GLUCOSE        I&O's Summary      PHYSICAL EXAM:  GENERAL: NAD, well-developed  HEAD:  Atraumatic, Normocephalic  EYES: EOMI, PERRLA, conjunctiva and sclera clear  NECK: Supple, No JVD  CHEST/LUNG: Clear to auscultation bilaterally; No wheeze  HEART: Regular rate and rhythm; No murmurs, rubs, or gallops  ABDOMEN: Soft, Nontender, Nondistended; Bowel sounds present  EXTREMITIES:  2+ Peripheral Pulses, No clubbing, cyanosis, or edema  PSYCH: AAOx3  NEUROLOGY: non-focal  SKIN: No rashes or lesions    LABS:                        13.8   9.61  )-----------( 178      ( 24 Jun 2022 06:25 )             43.2     06-24    138  |  103  |  9   ----------------------------<  110<H>  3.2<L>   |  24  |  0.56    Ca    9.0      24 Jun 2022 06:25  Phos  2.9     06-24  Mg     2.20     06-24    TPro  7.3  /  Alb  3.6  /  TBili  0.3  /  DBili  x   /  AST  22  /  ALT  30  /  AlkPhos  89  06-24              RADIOLOGY & ADDITIONAL TESTS:    Imaging Personally Reviewed:    Consultant(s) Notes Reviewed:      Care Discussed with Consultants/Other Providers:   Patient is a 56y old  Male who presents with a chief complaint of Patient admitted with shortness of breath, subjective fever, cough (23 Jun 2022 14:28)      SUBJECTIVE / OVERNIGHT EVENTS: patient seen and examined by bedside, pt feeling much better, afebrile, Tmax 99.3, denies headache, dizziness, SOB, CP, Palpitations , N/V/D, abdominal pain, reports mild phlegm with cough   pt reports hx of snoring while sleeping for a long time, pt says its hereditary for him   pt saturating 95%on RA , wants to go home   History obtained with , ID  #[id number]     MEDICATIONS  (STANDING):  azithromycin  IVPB 500 milliGRAM(s) IV Intermittent every 24 hours  benzonatate 100 milliGRAM(s) Oral every 8 hours  cefTRIAXone   IVPB      cefTRIAXone   IVPB 1000 milliGRAM(s) IV Intermittent every 24 hours  enoxaparin Injectable 40 milliGRAM(s) SubCutaneous every 24 hours  guaifenesin/dextromethorphan Oral Liquid 10 milliLiter(s) Oral every 6 hours  influenza   Vaccine 0.5 milliLiter(s) IntraMuscular once  mupirocin 2% Ointment 1 Application(s) Topical two times a day  potassium chloride   Powder 40 milliEquivalent(s) Oral once  sodium chloride 0.9%. 1000 milliLiter(s) (75 mL/Hr) IV Continuous <Continuous>    MEDICATIONS  (PRN):  acetaminophen     Tablet .. 650 milliGRAM(s) Oral every 6 hours PRN Temp greater or equal to 38C (100.4F), Severe Pain (7 - 10)      Vital Signs Last 24 Hrs  T(C): 37.1 (24 Jun 2022 04:30), Max: 37.1 (23 Jun 2022 20:34)  T(F): 98.7 (24 Jun 2022 04:30), Max: 98.8 (23 Jun 2022 20:34)  HR: 80 (24 Jun 2022 04:30) (80 - 100)  BP: 145/87 (24 Jun 2022 04:30) (116/66 - 145/87)  BP(mean): --  RR: 18 (24 Jun 2022 04:30) (18 - 18)  SpO2: 100% (24 Jun 2022 04:30) (94% - 100%)  CAPILLARY BLOOD GLUCOSE        I&O's Summary      PHYSICAL EXAM:  GENERAL: NAD, well-developed  HEAD:  Atraumatic, Normocephalic  EYES: EOMI, PERRLA, conjunctiva and sclera clear  NECK: Supple, No JVD  CHEST/LUNG: Clear to auscultation bilaterally; No wheeze  HEART: Regular rate and rhythm; No murmurs, rubs, or gallops  ABDOMEN: Soft, Nontender, Nondistended; Bowel sounds present  EXTREMITIES:  2+ Peripheral Pulses, No clubbing, cyanosis, or edema  PSYCH: AAOx3  NEUROLOGY: non-focal  SKIN: No rashes or lesions    LABS:                        13.8   9.61  )-----------( 178      ( 24 Jun 2022 06:25 )             43.2     06-24    138  |  103  |  9   ----------------------------<  110<H>  3.2<L>   |  24  |  0.56    Ca    9.0      24 Jun 2022 06:25  Phos  2.9     06-24  Mg     2.20     06-24    TPro  7.3  /  Alb  3.6  /  TBili  0.3  /  DBili  x   /  AST  22  /  ALT  30  /  AlkPhos  89  06-24          Culture - Sputum (06.22.22 @ 16:24)    Gram Stain:   Few polymorphonuclear leukocytes per low power field  Moderate Squamous epithelial cells per low power field  Moderate Gram Positive Cocci in Clusters per oil power field  Moderate Gram Negative Rods per oil power field  Few Gram Positive Rods per oil power field    Specimen Source: .Sputum Sputum    Culture Results:   Normal Respiratory Sultana present        RADIOLOGY & ADDITIONAL TESTS:    Imaging Personally Reviewed:    Consultant(s) Notes Reviewed:      Care Discussed with Consultants/Other Providers:

## 2022-06-24 NOTE — PROGRESS NOTE ADULT - REASON FOR ADMISSION
Patient admitted with shortness of breath, subjective fever, cough

## 2022-06-24 NOTE — DISCHARGE NOTE NURSING/CASE MANAGEMENT/SOCIAL WORK - PATIENT PORTAL LINK FT
You can access the FollowMyHealth Patient Portal offered by [facility] by registering at the following website: [email or web address]. By joining [employer]’s FollowMyHealth portal, you will also be able to view your health information using other applications (apps) compatible with our system.

## 2022-06-24 NOTE — DISCHARGE NOTE PROVIDER - NSDCFUADDAPPT_GEN_ALL_CORE_FT
Follow up with your primary care physician for further monitoring in 1-2 weeks. Please call to arrange appointment.  Follow up with pulmonology within 1-2 weeks of discharge. You will need a sleep study.

## 2022-06-24 NOTE — PROGRESS NOTE ADULT - PROBLEM SELECTOR PLAN 1
Sepsis  ( leukocytosis, tachycardia, tachypnea, fever) due to PNA: Present on admission   - Patient with likely viral illness at first a few weeks back; +sick contacts at home  - Now symptoms consistent with possible secondary bacterial PNA with sepsis present   - WBC 15.5, tachypnea in mid 20s, sinus tachycardia, lactate 2.5->1.5 and blood tinged yellow sputum - no china hemoptysis   - RVP negative x 2, BCx and UCx pending in lab  - CT chest shows right upper lobe tree-in-bud and groundglass opacities may be compatible   with endobronchial spread of infection  - will check sputum culture, check MRSA PCR, and urine legionella/strep pneumo   -blood cx and Urine cx NGTD   - will check quantiferon gold in AM - though low suspicion for active TB at this time   - Admitting physician discussed CT findings with radiology - imaging not typical of TB infection; will hold off on respiratory isolation  - s/p CTX and Azithro in ED - given severity of illness, ABx  broadened  to Vanc 1gm q12 and Zosyn q8h and continue Azithro 500 IV q24h pending legionella results  - saturating 96-97% on room air at time of my interview, continue supplemental O2 with NC as needed   - pt febrile to 102.9, worsening Leucocytosis, worsening bandemia ,elevated procalcitonin ,concerning for gram negative Pneumonia ,  will request ID eval   - continue robitussin and tessalon ATC x 2 days for now Sepsis  ( leukocytosis, tachycardia, tachypnea, fever) due to PNA: Present on admission   - Patient with likely viral illness at first a few weeks back; +sick contacts at home  - Now symptoms consistent with possible secondary bacterial PNA with sepsis present   - WBC 15.5, tachypnea in mid 20s, sinus tachycardia, lactate 2.5->1.5 and blood tinged yellow sputum - no [place]ina hemoptysis   - RVP negative x 2, BCx and UCx NGTD   - CT chest shows right upper lobe tree-in-bud and groundglass opacities may be compatible   with endobronchial spread of infection ,, pt will need repeat imagin in 4-6 weeks for resolution   -  sputum culture noted Moderate Gram Positive Cocci in Clusters ,Moderate Gram Negative Rods,  Gram Positive Rods per oil power field  , check MRSA PCR, and urine legionella negative  -F/U strep pneumo   - F/U  QuantiFeron gold in AM - though low suspicion for active TB at this time   - Admitting physician discussed CT findings with radiology - imaging not typical of TB infection; will hold off on respiratory isolation  - s/p CTX and Azithro in ED - given severity of illness, ABx  broadened  to Vanc 1gm q12 and Zosyn q8h and continue Azithro 500 IV q24h pending legionella results  - pt was febrile to 102.9, worsening Leucocytosis, worsening bandemia ,elevated procalcitonin ,concerning for gram negative Pneumonia ,  ID eval appreciated, recs noted, Zosyn  changed to CTX , DC vanco and c/w Azithromycin   - continue Robitussin and tessalon ATC x 2 days for now  pt clinically improved, afebrile, leucocytosis resolved, wants to go home, will d/w ID

## 2022-06-24 NOTE — PROGRESS NOTE ADULT - SUBJECTIVE AND OBJECTIVE BOX
Follow Up:      Inverval History/ROS:Patient is a 56y old  Male who presents with a chief complaint of Patient admitted with shortness of breath, subjective fever, cough (24 Jun 2022 13:08)    No fever  Less lcough  less sob  Feels well    Allergies    No Known Allergies    Intolerances        ANTIMICROBIALS:  cefTRIAXone   IVPB    cefTRIAXone   IVPB 1000 every 24 hours      OTHER MEDS:  acetaminophen     Tablet .. 650 milliGRAM(s) Oral every 6 hours PRN  enoxaparin Injectable 40 milliGRAM(s) SubCutaneous every 24 hours  influenza   Vaccine 0.5 milliLiter(s) IntraMuscular once  mupirocin 2% Ointment 1 Application(s) Topical two times a day  sodium chloride 0.9%. 1000 milliLiter(s) IV Continuous <Continuous>      Vital Signs Last 24 Hrs  T(C): 36.7 (24 Jun 2022 12:18), Max: 37.1 (23 Jun 2022 20:34)  T(F): 98.1 (24 Jun 2022 12:18), Max: 98.8 (23 Jun 2022 20:34)  HR: 94 (24 Jun 2022 12:18) (80 - 100)  BP: 118/74 (24 Jun 2022 12:18) (116/66 - 145/87)  BP(mean): --  RR: 18 (24 Jun 2022 12:18) (18 - 18)  SpO2: 100% (24 Jun 2022 12:18) (94% - 100%)    PHYSICAL EXAM:  General: x[ ] non-toxic  HEAD/EYES: [ ] PERRL [x ] white sclera [ ] icterus  ENT:  [ ] normal [ ] supple [ ] thrush [ ] pharyngeal exudate  Cardiovascular:   [ ] murmur [ ] normal [ ] PPM/AICD  Respiratory:  [x ] clear to ausculation bilaterally  GI:  x[ ] soft, non-tender, normal bowel sounds  :  [ ] reyes [ ] no CVA tenderness   Musculoskeletal:  [ ] no synovitis  Neurologic:  [ ] non-focal exam   Skin:  x[ ] no rash  Lymph: [ x] no lymphadenopathy  Psychiatric:  [ ] appropriate affect [x ] alert & oriented  Lines:  x[ ] no phlebitis [ ] central line                                13.8   9.61  )-----------( 178      ( 24 Jun 2022 06:25 )             43.2       06-24    138  |  103  |  9   ----------------------------<  110<H>  3.2<L>   |  24  |  0.56    Ca    9.0      24 Jun 2022 06:25  Phos  2.9     06-24  Mg     2.20     06-24    TPro  7.3  /  Alb  3.6  /  TBili  0.3  /  DBili  x   /  AST  22  /  ALT  30  /  AlkPhos  89  06-24          MICROBIOLOGY:Culture Results:   Normal Respiratory Sultana present (06-22-22 @ 16:24)  Culture Results:   No growth to date. (06-21-22 @ 23:40)  Culture Results:   No growth to date. (06-21-22 @ 23:00)  Culture Results:   <10,000 CFU/mL Normal Urogenital Sultana (06-21-22 @ 23:00)      RADIOLOGY:

## 2022-06-24 NOTE — PROGRESS NOTE ADULT - ASSESSMENT
56 year old with recent viral syndrome  Presents with fever, cough chills and diarrhea    Imaging suggestive of pneumonia    Finish 5 d course of abx with levaqun    Repeat chest imaging with pmd in 6-8 weeks  
56M with no reported PMHx reports feeling sick for about 2 weeks, found to be septic with PNA. 
56M with no reported PMHx reports feeling sick for about 2 weeks, found to be septic with PNA.

## 2022-06-24 NOTE — DISCHARGE NOTE PROVIDER - NSDCCPCAREPLAN_GEN_ALL_CORE_FT
PRINCIPAL DISCHARGE DIAGNOSIS  Diagnosis: Pneumonia  Assessment and Plan of Treatment: You were admitted to the hospital for pneumonia. You were treated with IV antibiotics. You were seen and evaluated by Infectious disease. You are to continue with antibiotics as prescribed and repeat Chest Xray as outpatient with PCP in 2 months.      SECONDARY DISCHARGE DIAGNOSES  Diagnosis: Sleep apnea, obstructive  Assessment and Plan of Treatment: You were found to have dropping of your oxygen saturation and snoring at night, which is concerning for sleep apnea. You are to follow up with pulmonary for sleep study referral.     PRINCIPAL DISCHARGE DIAGNOSIS  Diagnosis: Pneumonia  Assessment and Plan of Treatment: You were admitted to the hospital for pneumonia. You were treated with IV antibiotics. You were seen and evaluated by Infectious disease. You are to continue with antibiotics as prescribed and repeat CT chest as outpatient with PCP in 2 months.      SECONDARY DISCHARGE DIAGNOSES  Diagnosis: Sleep apnea, obstructive  Assessment and Plan of Treatment: You were found to have dropping of your oxygen saturation and snoring at night, which is concerning for sleep apnea. You are to follow up with pulmonary for sleep study referral.

## 2022-06-24 NOTE — DISCHARGE NOTE PROVIDER - HOSPITAL COURSE
56M with no reported PMHx reports feeling sick for about 2 weeks, found to be septic with PNA.     Sepsis.   - Sepsis  ( leukocytosis, tachycardia, tachypnea, fever) due to PNA: Present on admission   - Patient with likely viral illness at first a few weeks back; +sick contacts at home  - Now symptoms consistent with possible secondary bacterial PNA with sepsis present   - WBC 15.5, tachypnea in mid 20s, sinus tachycardia, lactate 2.5->1.5 and blood tinged yellow sputum - no [place]ina hemoptysis   - RVP negative x 2, BCx and UCx pending in lab  - CT chest shows right upper lobe tree-in-bud and groundglass opacities may be compatible with endobronchial spread of infection  -  sputum culture normal respiratory luiza   - MRSA PCR negative  - urine legionella negative  - strep pneumo pending   -blood cx and Urine cx NGTD   -  quantiferon gold_________  - Admitting physician discussed CT findings with radiology - imaging not typical of TB infection; will hold off on respiratory isolation  - s/p CTX and Azithro in ED - given severity of illness, ABx  broadened  to Vanc 1gm q12 and Zosyn q8h and continue Azithro 500 IV q24h    - saturating 96-97% on room air at time of my interview, continue supplemental O2 with NC as needed   - pt febrile to 102.9, worsening Leucocytosis, worsening bandemia ,elevated procalcitonin ,concerning for gram negative Pneumonia   - Seen and evaluated by ID, recommended CTX + azithro   - continue robitussin and tessalon ATC x 2 days for now.    Electrolyte disorder.   - mild hyponatremia: will CTM, now resolved   - hypophosphatemia: supplemented  - Pt to follow up with PCP as outpatient for further electrolyte monitoring as outpatient      Prophylactic measure.   - DVT ppx: Lovenox 40mg daily for ppx.    On_________, discussed with __________, patient is medically cleared and optimized for discharge today. All medications were reviewed with attending, and sent to mutually agreed upon pharmacy.   56M with no reported PMHx reports feeling sick for about 2 weeks, found to be septic with PNA.     Sepsis.   - Sepsis  ( leukocytosis, tachycardia, tachypnea, fever) due to PNA: Present on admission   - Patient with likely viral illness at first a few weeks back; +sick contacts at home  - Now symptoms consistent with possible secondary bacterial PNA with sepsis present   - WBC 15.5, tachypnea in mid 20s, sinus tachycardia, lactate 2.5->1.5 and blood tinged yellow sputum - no [place]ina hemoptysis   - RVP negative x 2, BCx and UCx pending in lab  - CT chest shows right upper lobe tree-in-bud and groundglass opacities may be compatible with endobronchial spread of infection  -  sputum culture normal respiratory luiza   - MRSA PCR negative  - urine legionella negative  - strep pneumo pending   -blood cx and Urine cx NGTD   -  quantiferon gold_________  - Admitting physician discussed CT findings with radiology - imaging not typical of TB infection; will hold off on respiratory isolation  - s/p CTX and Azithro in ED - given severity of illness, ABx  broadened  to Vanc 1gm q12 and Zosyn q8h and continue Azithro 500 IV q24h    - saturating 96-97% on room air at time of my interview, continue supplemental O2 with NC as needed   - pt febrile to 102.9, worsening Leucocytosis, worsening bandemia ,elevated procalcitonin ,concerning for gram negative Pneumonia   - Seen and evaluated by ID, recommended CTX + azithro   - continue robitussin and tessalon ATC x 2 days for now.    R/O Sleep Apnea  - episodes of desaturation at night and + snoring, concerning for sleep apnea  - Pt to follow up with pulmonary as outpatient for sleep study.     Electrolyte disorder.   - mild hyponatremia: will CTM, now resolved   - hypophosphatemia: supplemented  - Pt to follow up with PCP as outpatient for further electrolyte monitoring as outpatient      Prophylactic measure.   - DVT ppx: Lovenox 40mg daily for ppx.    On 6/24/2022, discussed with Dr. Schuster, patient is medically cleared and optimized for discharge today. All medications were reviewed with attending, and sent to mutually agreed upon pharmacy.

## 2022-06-24 NOTE — PROGRESS NOTE ADULT - PROBLEM SELECTOR PLAN 2
mild hyponatremia: will CTM  hypophosphatemia: will supplement , monitor BMP mild hyponatremia: resolved   hypophosphatemia: resolved   Hypokalemia : will supplement , monitor BMP

## 2022-06-24 NOTE — DISCHARGE NOTE NURSING/CASE MANAGEMENT/SOCIAL WORK - NSDCPEFALRISK_GEN_ALL_CORE
For information on Fall & Injury Prevention, visit: [email or web address] OR  [email or web address] OR  https://www.cdc.gov/steadi/patient.html

## 2022-06-25 LAB
GAMMA INTERFERON BACKGROUND BLD IA-ACNC: 0.05 IU/ML — SIGNIFICANT CHANGE UP
M TB IFN-G BLD-IMP: NEGATIVE — SIGNIFICANT CHANGE UP
M TB IFN-G CD4+ BCKGRND COR BLD-ACNC: 0.01 IU/ML — SIGNIFICANT CHANGE UP
M TB IFN-G CD4+CD8+ BCKGRND COR BLD-ACNC: 0 IU/ML — SIGNIFICANT CHANGE UP
QUANT TB PLUS MITOGEN MINUS NIL: 5.88 IU/ML — SIGNIFICANT CHANGE UP

## 2022-06-27 LAB
CULTURE RESULTS: SIGNIFICANT CHANGE UP
CULTURE RESULTS: SIGNIFICANT CHANGE UP
SPECIMEN SOURCE: SIGNIFICANT CHANGE UP
SPECIMEN SOURCE: SIGNIFICANT CHANGE UP

## 2023-03-12 NOTE — PATIENT PROFILE ADULT - HISTORY OF COVID-19 VACCINATION
I attest my time as attending is greater than 50% of the total combined time spent on qualifying patient care activities by the PA/NP and attending. I attest my time as attending is greater than 50% of the total combined time spent on qualifying patient care activities by the PA/NP and attending. Yes

## 2024-05-29 NOTE — H&P ADULT - NSHPLABSRESULTS_GEN_ALL_CORE
Normal for race
15.0   15.54 )-----------( 227      ( 21 Jun 2022 23:40 )             46.7   06-21    139  |  99  |  16  ----------------------------<  120<H>  3.8   |  25  |  0.78    Ca    9.2      21 Jun 2022 23:40    TPro  8.1  /  Alb  4.7  /  TBili  0.5  /  DBili  x   /  AST  16  /  ALT  21  /  AlkPhos  102  06-21    RVP negative x 2  UA neg  UCx pending in lab  BCx x 2 pending in lab   pH 7.33, lactate 2.5->1.5  TropT 7  BNP 96    EKG: sinus tach @ 107bpm, incomplete RBBB, QTc 427, no acute ischemic changes, unchanged compared to 2014    CXR: clear lungs    CT Chest: Right upper lobe tree-in-bud and groundglass opacities may be compatible   with endobronchial spread of infection. Follow-up recommended in 4-6   weeks to ensure resolution.